# Patient Record
Sex: FEMALE | Race: OTHER | NOT HISPANIC OR LATINO
[De-identification: names, ages, dates, MRNs, and addresses within clinical notes are randomized per-mention and may not be internally consistent; named-entity substitution may affect disease eponyms.]

---

## 2017-09-13 ENCOUNTER — APPOINTMENT (OUTPATIENT)
Dept: SURGERY | Facility: CLINIC | Age: 52
End: 2017-09-13
Payer: COMMERCIAL

## 2017-09-13 VITALS
BODY MASS INDEX: 26.22 KG/M2 | HEART RATE: 94 BPM | DIASTOLIC BLOOD PRESSURE: 85 MMHG | HEIGHT: 68 IN | TEMPERATURE: 98.3 F | WEIGHT: 173 LBS | SYSTOLIC BLOOD PRESSURE: 138 MMHG

## 2017-09-13 DIAGNOSIS — Z78.9 OTHER SPECIFIED HEALTH STATUS: ICD-10-CM

## 2017-09-13 DIAGNOSIS — Z83.3 FAMILY HISTORY OF DIABETES MELLITUS: ICD-10-CM

## 2017-09-13 DIAGNOSIS — Z82.49 FAMILY HISTORY OF ISCHEMIC HEART DISEASE AND OTHER DISEASES OF THE CIRCULATORY SYSTEM: ICD-10-CM

## 2017-09-13 DIAGNOSIS — Z87.19 PERSONAL HISTORY OF OTHER DISEASES OF THE DIGESTIVE SYSTEM: ICD-10-CM

## 2017-09-13 PROBLEM — Z00.00 ENCOUNTER FOR PREVENTIVE HEALTH EXAMINATION: Status: ACTIVE | Noted: 2017-09-13

## 2017-09-13 PROCEDURE — 99202 OFFICE O/P NEW SF 15 MIN: CPT

## 2017-09-20 ENCOUNTER — APPOINTMENT (OUTPATIENT)
Dept: SURGERY | Facility: CLINIC | Age: 52
End: 2017-09-20
Payer: COMMERCIAL

## 2017-09-20 VITALS
HEIGHT: 68 IN | TEMPERATURE: 97.8 F | SYSTOLIC BLOOD PRESSURE: 124 MMHG | WEIGHT: 173 LBS | DIASTOLIC BLOOD PRESSURE: 79 MMHG | BODY MASS INDEX: 26.22 KG/M2 | HEART RATE: 64 BPM

## 2017-09-20 PROCEDURE — 11403 EXC TR-EXT B9+MARG 2.1-3CM: CPT

## 2017-10-02 ENCOUNTER — APPOINTMENT (OUTPATIENT)
Dept: SURGERY | Facility: CLINIC | Age: 52
End: 2017-10-02

## 2019-05-03 ENCOUNTER — EMERGENCY (EMERGENCY)
Facility: HOSPITAL | Age: 54
LOS: 1 days | Discharge: HOME | End: 2019-05-03
Attending: EMERGENCY MEDICINE
Payer: MEDICAID

## 2019-05-03 VITALS
HEIGHT: 67.5 IN | TEMPERATURE: 98 F | WEIGHT: 171.96 LBS | SYSTOLIC BLOOD PRESSURE: 117 MMHG | OXYGEN SATURATION: 100 % | RESPIRATION RATE: 18 BRPM | DIASTOLIC BLOOD PRESSURE: 82 MMHG | HEART RATE: 70 BPM

## 2019-05-03 PROCEDURE — 99283 EMERGENCY DEPT VISIT LOW MDM: CPT | Mod: 25

## 2019-05-03 PROCEDURE — 90715 TDAP VACCINE 7 YRS/> IM: CPT

## 2019-05-03 PROCEDURE — 90471 IMMUNIZATION ADMIN: CPT

## 2019-05-03 PROCEDURE — 99284 EMERGENCY DEPT VISIT MOD MDM: CPT | Mod: 25

## 2019-05-03 RX ORDER — ACETAMINOPHEN 500 MG
975 TABLET ORAL ONCE
Refills: 0 | Status: COMPLETED | OUTPATIENT
Start: 2019-05-03 | End: 2019-05-03

## 2019-05-03 RX ORDER — OXYCODONE HYDROCHLORIDE 5 MG/1
5 TABLET ORAL ONCE
Refills: 0 | Status: DISCONTINUED | OUTPATIENT
Start: 2019-05-03 | End: 2019-05-03

## 2019-05-03 RX ORDER — TETANUS TOXOID, REDUCED DIPHTHERIA TOXOID AND ACELLULAR PERTUSSIS VACCINE, ADSORBED 5; 2.5; 8; 8; 2.5 [IU]/.5ML; [IU]/.5ML; UG/.5ML; UG/.5ML; UG/.5ML
0.5 SUSPENSION INTRAMUSCULAR ONCE
Refills: 0 | Status: COMPLETED | OUTPATIENT
Start: 2019-05-03 | End: 2019-05-03

## 2019-05-03 RX ADMIN — OXYCODONE HYDROCHLORIDE 5 MILLIGRAM(S): 5 TABLET ORAL at 23:53

## 2019-05-03 RX ADMIN — Medication 975 MILLIGRAM(S): at 23:53

## 2019-05-03 RX ADMIN — TETANUS TOXOID, REDUCED DIPHTHERIA TOXOID AND ACELLULAR PERTUSSIS VACCINE, ADSORBED 0.5 MILLILITER(S): 5; 2.5; 8; 8; 2.5 SUSPENSION INTRAMUSCULAR at 23:53

## 2019-05-04 RX ORDER — OXYCODONE HYDROCHLORIDE 5 MG/1
1 TABLET ORAL
Qty: 5 | Refills: 0
Start: 2019-05-04
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
## 2019-05-04 NOTE — ED PROVIDER NOTE - DISPOSITION TYPE
DISCHARGE Purse String (Simple) Text: Given the location of the defect and the characteristics of the surrounding skin a pursestring closure was deemed most appropriate.  Undermining was performed circumfirentially around the surgical defect.  A purstring suture was then placed and tightened.

## 2019-07-06 ENCOUNTER — EMERGENCY (EMERGENCY)
Facility: HOSPITAL | Age: 54
LOS: 1 days | Discharge: HOME | End: 2019-07-06
Attending: EMERGENCY MEDICINE
Payer: MEDICAID

## 2019-07-06 VITALS
HEART RATE: 69 BPM | TEMPERATURE: 98 F | OXYGEN SATURATION: 100 % | DIASTOLIC BLOOD PRESSURE: 82 MMHG | SYSTOLIC BLOOD PRESSURE: 122 MMHG | HEIGHT: 67 IN | RESPIRATION RATE: 24 BRPM | WEIGHT: 169.09 LBS

## 2019-07-06 VITALS
RESPIRATION RATE: 20 BRPM | OXYGEN SATURATION: 100 % | DIASTOLIC BLOOD PRESSURE: 72 MMHG | SYSTOLIC BLOOD PRESSURE: 105 MMHG | HEART RATE: 58 BPM | TEMPERATURE: 98 F

## 2019-07-06 DIAGNOSIS — Z98.890 OTHER SPECIFIED POSTPROCEDURAL STATES: Chronic | ICD-10-CM

## 2019-07-06 LAB
ALBUMIN SERPL ELPH-MCNC: 4.2 G/DL — SIGNIFICANT CHANGE UP (ref 3.3–5)
ALP SERPL-CCNC: 77 U/L — SIGNIFICANT CHANGE UP (ref 40–120)
ALT FLD-CCNC: 16 U/L — SIGNIFICANT CHANGE UP (ref 10–45)
ANION GAP SERPL CALC-SCNC: 13 MMOL/L — SIGNIFICANT CHANGE UP (ref 5–17)
APPEARANCE UR: ABNORMAL
APTT BLD: 28.2 SEC — SIGNIFICANT CHANGE UP (ref 27.5–36.3)
AST SERPL-CCNC: 17 U/L — SIGNIFICANT CHANGE UP (ref 10–40)
BASOPHILS # BLD AUTO: 0.1 K/UL — SIGNIFICANT CHANGE UP (ref 0–0.2)
BASOPHILS NFR BLD AUTO: 0.8 % — SIGNIFICANT CHANGE UP (ref 0–2)
BILIRUB SERPL-MCNC: 0.4 MG/DL — SIGNIFICANT CHANGE UP (ref 0.2–1.2)
BILIRUB UR-MCNC: NEGATIVE — SIGNIFICANT CHANGE UP
BUN SERPL-MCNC: 17 MG/DL — SIGNIFICANT CHANGE UP (ref 7–23)
CALCIUM SERPL-MCNC: 9 MG/DL — SIGNIFICANT CHANGE UP (ref 8.4–10.5)
CHLORIDE SERPL-SCNC: 104 MMOL/L — SIGNIFICANT CHANGE UP (ref 96–108)
CO2 SERPL-SCNC: 25 MMOL/L — SIGNIFICANT CHANGE UP (ref 22–31)
COLOR SPEC: YELLOW — SIGNIFICANT CHANGE UP
CREAT SERPL-MCNC: 0.8 MG/DL — SIGNIFICANT CHANGE UP (ref 0.5–1.3)
DIFF PNL FLD: ABNORMAL
EOSINOPHIL # BLD AUTO: 0.1 K/UL — SIGNIFICANT CHANGE UP (ref 0–0.5)
EOSINOPHIL NFR BLD AUTO: 1.6 % — SIGNIFICANT CHANGE UP (ref 0–6)
GLUCOSE SERPL-MCNC: 136 MG/DL — HIGH (ref 70–99)
GLUCOSE UR QL: NEGATIVE — SIGNIFICANT CHANGE UP
HCG UR QL: NEGATIVE — SIGNIFICANT CHANGE UP
HCT VFR BLD CALC: 39.5 % — SIGNIFICANT CHANGE UP (ref 34.5–45)
HGB BLD-MCNC: 13.7 G/DL — SIGNIFICANT CHANGE UP (ref 11.5–15.5)
INR BLD: 0.92 RATIO — SIGNIFICANT CHANGE UP (ref 0.88–1.16)
KETONES UR-MCNC: NEGATIVE — SIGNIFICANT CHANGE UP
LEUKOCYTE ESTERASE UR-ACNC: NEGATIVE — SIGNIFICANT CHANGE UP
LYMPHOCYTES # BLD AUTO: 3.3 K/UL — SIGNIFICANT CHANGE UP (ref 1–3.3)
LYMPHOCYTES # BLD AUTO: 48.2 % — HIGH (ref 13–44)
MCHC RBC-ENTMCNC: 32.8 PG — SIGNIFICANT CHANGE UP (ref 27–34)
MCHC RBC-ENTMCNC: 34.6 GM/DL — SIGNIFICANT CHANGE UP (ref 32–36)
MCV RBC AUTO: 95 FL — SIGNIFICANT CHANGE UP (ref 80–100)
MONOCYTES # BLD AUTO: 0.4 K/UL — SIGNIFICANT CHANGE UP (ref 0–0.9)
MONOCYTES NFR BLD AUTO: 5.3 % — SIGNIFICANT CHANGE UP (ref 2–14)
NEUTROPHILS # BLD AUTO: 3 K/UL — SIGNIFICANT CHANGE UP (ref 1.8–7.4)
NEUTROPHILS NFR BLD AUTO: 44.1 % — SIGNIFICANT CHANGE UP (ref 43–77)
NITRITE UR-MCNC: NEGATIVE — SIGNIFICANT CHANGE UP
PH UR: 6 — SIGNIFICANT CHANGE UP (ref 5–8)
PLATELET # BLD AUTO: 200 K/UL — SIGNIFICANT CHANGE UP (ref 150–400)
POTASSIUM SERPL-MCNC: 3.8 MMOL/L — SIGNIFICANT CHANGE UP (ref 3.5–5.3)
POTASSIUM SERPL-SCNC: 3.8 MMOL/L — SIGNIFICANT CHANGE UP (ref 3.5–5.3)
PROT SERPL-MCNC: 7.1 G/DL — SIGNIFICANT CHANGE UP (ref 6–8.3)
PROT UR-MCNC: ABNORMAL
PROTHROM AB SERPL-ACNC: 10.5 SEC — SIGNIFICANT CHANGE UP (ref 10–12.9)
RBC # BLD: 4.16 M/UL — SIGNIFICANT CHANGE UP (ref 3.8–5.2)
RBC # FLD: 11.5 % — SIGNIFICANT CHANGE UP (ref 10.3–14.5)
SODIUM SERPL-SCNC: 142 MMOL/L — SIGNIFICANT CHANGE UP (ref 135–145)
SP GR SPEC: 1.03 — HIGH (ref 1.01–1.02)
UROBILINOGEN FLD QL: NEGATIVE — SIGNIFICANT CHANGE UP
WBC # BLD: 6.8 K/UL — SIGNIFICANT CHANGE UP (ref 3.8–10.5)
WBC # FLD AUTO: 6.8 K/UL — SIGNIFICANT CHANGE UP (ref 3.8–10.5)

## 2019-07-06 PROCEDURE — 96374 THER/PROPH/DIAG INJ IV PUSH: CPT

## 2019-07-06 PROCEDURE — 99284 EMERGENCY DEPT VISIT MOD MDM: CPT | Mod: 25

## 2019-07-06 PROCEDURE — 74176 CT ABD & PELVIS W/O CONTRAST: CPT | Mod: 26

## 2019-07-06 PROCEDURE — 80053 COMPREHEN METABOLIC PANEL: CPT

## 2019-07-06 PROCEDURE — 85610 PROTHROMBIN TIME: CPT

## 2019-07-06 PROCEDURE — 85730 THROMBOPLASTIN TIME PARTIAL: CPT

## 2019-07-06 PROCEDURE — 96361 HYDRATE IV INFUSION ADD-ON: CPT

## 2019-07-06 PROCEDURE — 74176 CT ABD & PELVIS W/O CONTRAST: CPT

## 2019-07-06 PROCEDURE — 99285 EMERGENCY DEPT VISIT HI MDM: CPT

## 2019-07-06 PROCEDURE — 87086 URINE CULTURE/COLONY COUNT: CPT

## 2019-07-06 PROCEDURE — 81025 URINE PREGNANCY TEST: CPT

## 2019-07-06 PROCEDURE — 81001 URINALYSIS AUTO W/SCOPE: CPT

## 2019-07-06 PROCEDURE — 85027 COMPLETE CBC AUTOMATED: CPT

## 2019-07-06 RX ORDER — SODIUM CHLORIDE 9 MG/ML
1000 INJECTION, SOLUTION INTRAVENOUS ONCE
Refills: 0 | Status: COMPLETED | OUTPATIENT
Start: 2019-07-06 | End: 2019-07-06

## 2019-07-06 RX ORDER — OXYCODONE HYDROCHLORIDE 5 MG/1
1 TABLET ORAL
Qty: 9 | Refills: 0
Start: 2019-07-06 | End: 2019-07-08

## 2019-07-06 RX ORDER — MORPHINE SULFATE 50 MG/1
4 CAPSULE, EXTENDED RELEASE ORAL ONCE
Refills: 0 | Status: DISCONTINUED | OUTPATIENT
Start: 2019-07-06 | End: 2019-07-06

## 2019-07-06 RX ADMIN — MORPHINE SULFATE 4 MILLIGRAM(S): 50 CAPSULE, EXTENDED RELEASE ORAL at 06:30

## 2019-07-06 RX ADMIN — SODIUM CHLORIDE 2000 MILLILITER(S): 9 INJECTION, SOLUTION INTRAVENOUS at 05:18

## 2019-07-06 RX ADMIN — SODIUM CHLORIDE 1000 MILLILITER(S): 9 INJECTION, SOLUTION INTRAVENOUS at 06:30

## 2019-07-06 RX ADMIN — MORPHINE SULFATE 4 MILLIGRAM(S): 50 CAPSULE, EXTENDED RELEASE ORAL at 05:18

## 2019-07-06 NOTE — ED PROVIDER NOTE - NS ED ROS FT
CONST: no fevers, chills, or lightheadedness  EYES: no pain, no vision change  HENT: atraumatic, no sore throat  CV: no chest pain, no palpitations  RESP: no shortness of breath  ABD: + abdominal pain, + nausea/vomiting  : no dysuria, no hematuria, + L sided flank pain  MSK: no musculoskeletal pain  NEURO: no headache, no focal weakness or loss of sensation  SKIN:  no rash, no lesions

## 2019-07-06 NOTE — ED PROVIDER NOTE - CARE PROVIDERS DIRECT ADDRESSES
[email or web address]

## 2019-07-06 NOTE — ED ADULT NURSE NOTE - NSIMPLEMENTINTERV_GEN_ALL_ED
Implemented All Universal Safety Interventions:  [place] to call system. Call bell, personal items and telephone within reach. Instruct patient to call for assistance. Room bathroom lighting operational. Non-slip footwear when patient is off stretcher. Physically safe environment: no spills, clutter or unnecessary equipment. Stretcher in lowest position, wheels locked, appropriate side rails in place.

## 2019-07-06 NOTE — ED PROVIDER NOTE - OBJECTIVE STATEMENT
54 F with PMH of renal stones requiring lithotripsy, IBS, MVP presenting to the ED with severe L sided flank pain that woke her up from sleep. Pain is a sharp pain radiating down.  Patient had some L sided abdominal discomfort yesterday, worsening this morning. She had 2 episodes of vomiting today. No dysuria or hematuria. Pain is similar to previous kidney stone episodes. No fevers, chills, chest pain, shortness of breath, diarrhea, or constipation.

## 2019-07-06 NOTE — ED PROVIDER NOTE - CLINICAL SUMMARY MEDICAL DECISION MAKING FREE TEXT BOX
Attending MD Herrera: 54F with h/o kidney stones in past presenting with left flank pain, likely recurrent ureteral colic. Plan for CT a/p to confirm, analgesia and reassess

## 2019-07-06 NOTE — ED PROVIDER NOTE - NSFOLLOWUPINSTRUCTIONS_ED_ALL_ED_FT
Please follow up with your primary physician in 24-48 hr.  Seek immediate medical care for any new/worsening signs or symptoms.  Follow up with urology regarding 5mm left kidney stone and 1.4cm exophytic lesion

## 2019-07-06 NOTE — ED PROVIDER NOTE - PHYSICAL EXAMINATION
*GEN:   comfortable, in no acute distress, AOx3  *EYES:   pupils equally round and reactive to light  *HEENT:   airway patent, moist mucosal membranes  *CV:   regular rate and rhythm  *RESP:   clear to auscultation bilaterally, non-labored  *ABD:   soft, nondistended, tender in the R flank area   *:   no cva tenderness   *EXTREM:   no MSK tenderness, no leg swelling  *SKIN:   dry, intact  *NEURO:   AOx3, no focal weakness or loss of sensation

## 2019-07-06 NOTE — ED PROVIDER NOTE - CARE PROVIDER_API CALL
Hoenig, David M (MD)  Urology  [facility] Dept of Urology, [address] [place] [address]  Phone: [phone]  Fax: [phone]  Follow Up Time:

## 2019-07-06 NOTE — ED ADULT NURSE REASSESSMENT NOTE - COMFORT CARE
plan of care explained/side rails up/warm blanket provided/treatment delay explained/wait time explained

## 2019-07-06 NOTE — ED PROVIDER NOTE - PROGRESS NOTE DETAILS
Flaco Winston MD, PGY3: Patient received at resident sign out. Discussed 5mm stone and exophytic lesion with the patient who verbalized understanding and requested urology contact information. Provided name and number for outpatient follow up. Pt appears well, no current pain, understands plan for follow up. Patient informed of ED visit findings, understands plan.  Patient provided with written and further verbal instructions not included in discharge paperwork.  Patient instructed to follow up with their primary care physician in 2-3 days and return for new, worsened, or persistent symptoms. Flaco Winston MD, PGY3: Patient received at resident sign out. Discussed 5mm stone and exophytic lesion with the patient who verbalized understanding and requested urology contact information. Provided name and number for outpatient follow up. If pt tolerates PO, will discharge. Pt appears well, no current pain, understands plan for follow up. Patient informed of ED visit findings, understands plan.  Patient provided with written and further verbal instructions not included in discharge paperwork.  Patient instructed to follow up with their primary care physician in 2-3 days and return for new, worsened, or persistent symptoms.

## 2019-07-06 NOTE — ED ADULT NURSE NOTE - OBJECTIVE STATEMENT
55 y/o female PMH kidney stones most recently last year requiring lithotripsy (was told she still had 3 more at the time that would possibly pass on their own), IBS presents to ED c/o L flank and abdominal pain that woke her up from sleep this morning. Pt has been having L flank pain for a few days, but attributed it to her herniate discs. This morning, pt woke up in severe L sided pain, +nausea and vomiting. Pain is intermittent, 9/10 at its worst. Describes it as sharp and stabbing. Denies fever, chills, chest pain. Lungs clear b/l. Skin warm, dry, intact. Gross motor and neuro intact. Abdomen is soft, nondistended, nontender to palpation. Tenderness noted to L flank. 20G IV placed in LAC. Safety and comfort provided. Family at bedside.

## 2019-07-06 NOTE — ED PROVIDER NOTE - NSFOLLOWUPCLINICS_GEN_ALL_ED_FT
[facility]  Urology  [address] [place] [address] - [address] [place] [address]  Phone: [phone]  Fax:   Follow Up Time:

## 2019-07-07 LAB
CULTURE RESULTS: SIGNIFICANT CHANGE UP
SPECIMEN SOURCE: SIGNIFICANT CHANGE UP

## 2019-07-19 ENCOUNTER — APPOINTMENT (OUTPATIENT)
Dept: UROLOGY | Facility: CLINIC | Age: 54
End: 2019-07-19
Payer: MEDICAID

## 2019-07-19 VITALS
WEIGHT: 170 LBS | BODY MASS INDEX: 26.37 KG/M2 | HEART RATE: 68 BPM | TEMPERATURE: 98.7 F | DIASTOLIC BLOOD PRESSURE: 68 MMHG | SYSTOLIC BLOOD PRESSURE: 104 MMHG | HEIGHT: 67.5 IN | RESPIRATION RATE: 16 BRPM

## 2019-07-19 PROCEDURE — 99204 OFFICE O/P NEW MOD 45 MIN: CPT

## 2019-07-19 NOTE — HISTORY OF PRESENT ILLNESS
[FreeTextEntry1] : Pt is 55 yo female with recent left renal colic.  Passed several stones in 2018, and had ESWL 2018 (done with Dr. Merrick Roger "terrible experience", metrolitho, ended with [facility] eval/management).  Recent left renal colic, and eval in ER (had to access via pacs separate from this [employer] account).  No fever.  No gross hematuria.  Pain mild currently left side/abdomen.  Issue also of 'hyperdense/isodense cyst".\par Has not passed stone.\par \par PMH: aguirre's esoph, mitral valve prolaps, sleep apnea, anxiety\par PSH: ESWL, \par Meds: ibuprofen, oxycodone\par NKDA\par FH: mult members with kidney stones.\par SH: never a smoker, previous 3 cups per day, now 1.\par  [None] : no symptoms

## 2019-07-19 NOTE — ASSESSMENT
[FreeTextEntry1] : CT scan reviewed: clearly cyst right kidney, left renal structure lower pole: isodense/hyperdense cyst vs. small renal mass (1.5 cm diameter), but also small 3 mm stone right kidney, 4 to 5 mm left prox ureter stone, and cluster of 4 to 5 mm left lower pole stone fragments. Mild-to-mod left hydro.\par \par I had long discussion with patient about their stones, and about options, risks, and benefits of all treatments.  Main options for definitive stone treatment include ESWL, URS, PCNL.  \par \par ESWL success best with smaller, less dense stones, and with short skin to stone distance and favorable location of the stone within the urinary tract, while URS is more successful treatment with multiple stones, more dense stones, or challenging body habitus or stone location.  PCNL is best option for larger, more dense and complex stones, and particularly those involving the lower pole.  Non-definitive stone treatment options for drainage, using either stents or nephrostomy, also reviewed: these are of lower immediate surgical risks, but incur multiple procedures to manage and may have their own complications and effects on quality of life.  Still, nephrostomy or nephroureteral catheter can allow maintenance of urinary system drainage without surgical risks, and management in office with exchanges (avoiding the anesthesia and testing which would be present with bilateral internal stent exchange).\par \par Risks of nontreatment with obstruction can lead to very high rate of renal function loss in stone-bearing kidney over the next months to years.\par \par In this patient's case, I recommend... observe for passage vs left URS with laser, and delayed eval of renal lesion\par \par Risks/benefits/success/recovery expectations all reviewed at length, particularly with respect to patient's comorbidities, and inclusive of infection/sepsis, bleeding, need for secondary procedures or secondary stages such as embolization or open surgery, and even risks of death due to acute issues superimposed on comorbidities.\par \par Pt prefers to undergo: left URS with laser/stone removal, left stent\par \par Will schedule.\par \par Urine culture, PST appt to schedule once surgery scheduled.\par

## 2019-07-19 NOTE — LETTER BODY
[Dear  ___] : Dear  [unfilled], [Consult Letter:] : I had the pleasure of evaluating your patient, [unfilled]. [( Thank you for referring [unfilled] for consultation for _____ )] : Thank you for referring [unfilled] for consultation for [unfilled] [Please see my note below.] : Please see my note below. [Consult Closing:] : Thank you very much for allowing me to participate in the care of this patient.  If you have any questions, please do not hesitate to contact me. [Sincerely,] : Sincerely, [FreeTextEntry1] : Pt is 55 yo female with recent left renal colic.  Passed several stones in 2018, and had ESWL 2018 (done with Dr. Merrick Roger "terrible experience", metrolitho, ended with [facility] eval/management).  Recent left renal colic, and eval in ER (had to access via pacs separate from this [employer] account).  No fever.  No gross hematuria.  Pain mild currently left side/abdomen.  Issue also of 'hyperdense/isodense cyst".\par Has not passed stone.\par \par PMH: aguirre's esoph, mitral valve prolaps, sleep apnea, anxiety\par PSH: ESWL, \par Meds: ibuprofen, oxycodone\par NKDA\par FH: mult members with kidney stones.\par SH: never a smoker, previous 3 cups per day, now 1.\par \par CT scan reviewed: clearly cyst right kidney, left renal structure lower pole: isodense/hyperdense cyst vs. small renal mass (1.5 cm diameter), but also small 3 mm stone right kidney, 4 to 5 mm left prox ureter stone, and cluster of 4 to 5 mm left lower pole stone fragments. Mild-to-mod left hydro.\par \par I had long discussion with patient about their stones, and about options, risks, and benefits of all treatments.  Main options for definitive stone treatment include ESWL, URS, PCNL.  \par \par ESWL success best with smaller, less dense stones, and with short skin to stone distance and favorable location of the stone within the urinary tract, while URS is more successful treatment with multiple stones, more dense stones, or challenging body habitus or stone location.  PCNL is best option for larger, more dense and complex stones, and particularly those involving the lower pole.  Non-definitive stone treatment options for drainage, using either stents or nephrostomy, also reviewed: these are of lower immediate surgical risks, but incur multiple procedures to manage and may have their own complications and effects on quality of life.  Still, nephrostomy or nephroureteral catheter can allow maintenance of urinary system drainage without surgical risks, and management in office with exchanges (avoiding the anesthesia and testing which would be present with bilateral internal stent exchange).\par \par Risks of nontreatment with obstruction can lead to very high rate of renal function loss in stone-bearing kidney over the next months to years.\par \par In this patient's case, I recommend... observe for passage vs left URS with laser, and delayed eval of renal lesion\par \par Risks/benefits/success/recovery expectations all reviewed at length, particularly with respect to patient's comorbidities, and inclusive of infection/sepsis, bleeding, need for secondary procedures or secondary stages such as embolization or open surgery, and even risks of death due to acute issues superimposed on comorbidities.\par \par Pt prefers to undergo: left URS with laser/stone removal, left stent\par \par Will schedule.\par \par Urine culture, PST appt to schedule once surgery scheduled.\par

## 2019-07-19 NOTE — REVIEW OF SYSTEMS
[Negative] : Heme/Lymph [Recent Weight Gain (___ Lbs)] : recent [unfilled] ~Ulb weight gain [Recent Weight Loss (___ Lbs)] : recent [unfilled] ~Ulb weight loss [Eyesight Problems] : eyesight problems [Palpitations] : palpitations [Abdominal Pain] : abdominal pain [Vomiting] : vomiting [Heartburn] : heartburn [Date of last menstrual period ____] : date of last menstrual period: [unfilled] [Seen by urologist before (Name)  ___] : Previously seen by a urologist: [unfilled] [Urine Infection (bladder/kidney)] : bladder/kidney infection [Told you have blood in urine on a urine test] : told blood was present in a urine test [History of kidney stones] : history of kidney stones [Joint Pain] : joint pain

## 2019-08-05 ENCOUNTER — OUTPATIENT (OUTPATIENT)
Dept: OUTPATIENT SERVICES | Facility: HOSPITAL | Age: 54
LOS: 1 days | End: 2019-08-05
Payer: MEDICAID

## 2019-08-05 VITALS
TEMPERATURE: 98 F | SYSTOLIC BLOOD PRESSURE: 126 MMHG | DIASTOLIC BLOOD PRESSURE: 87 MMHG | WEIGHT: 162.92 LBS | OXYGEN SATURATION: 98 % | HEIGHT: 67 IN | RESPIRATION RATE: 16 BRPM | HEART RATE: 68 BPM

## 2019-08-05 DIAGNOSIS — N20.0 CALCULUS OF KIDNEY: ICD-10-CM

## 2019-08-05 DIAGNOSIS — Z01.818 ENCOUNTER FOR OTHER PREPROCEDURAL EXAMINATION: ICD-10-CM

## 2019-08-05 DIAGNOSIS — Z98.890 OTHER SPECIFIED POSTPROCEDURAL STATES: Chronic | ICD-10-CM

## 2019-08-05 LAB
ANION GAP SERPL CALC-SCNC: 12 MMOL/L — SIGNIFICANT CHANGE UP (ref 5–17)
BUN SERPL-MCNC: 12 MG/DL — SIGNIFICANT CHANGE UP (ref 7–23)
CALCIUM SERPL-MCNC: 10.2 MG/DL — SIGNIFICANT CHANGE UP (ref 8.4–10.5)
CHLORIDE SERPL-SCNC: 102 MMOL/L — SIGNIFICANT CHANGE UP (ref 96–108)
CO2 SERPL-SCNC: 26 MMOL/L — SIGNIFICANT CHANGE UP (ref 22–31)
CREAT SERPL-MCNC: 0.71 MG/DL — SIGNIFICANT CHANGE UP (ref 0.5–1.3)
GLUCOSE SERPL-MCNC: 93 MG/DL — SIGNIFICANT CHANGE UP (ref 70–99)
POTASSIUM SERPL-MCNC: 4.2 MMOL/L — SIGNIFICANT CHANGE UP (ref 3.5–5.3)
POTASSIUM SERPL-SCNC: 4.2 MMOL/L — SIGNIFICANT CHANGE UP (ref 3.5–5.3)
SODIUM SERPL-SCNC: 140 MMOL/L — SIGNIFICANT CHANGE UP (ref 135–145)

## 2019-08-05 PROCEDURE — G0463: CPT

## 2019-08-05 PROCEDURE — 80048 BASIC METABOLIC PNL TOTAL CA: CPT

## 2019-08-05 PROCEDURE — 87086 URINE CULTURE/COLONY COUNT: CPT

## 2019-08-05 PROCEDURE — 85027 COMPLETE CBC AUTOMATED: CPT

## 2019-08-05 NOTE — H&P PST ADULT - NSICDXPASTMEDICALHX_GEN_ALL_CORE_FT
PAST MEDICAL HISTORY:  MILADY (Landin's esophagus)     GERD (gastroesophageal reflux disease)     MVP (mitral valve prolapse)     NORMAN (obstructive sleep apnea) Mild    Renal stone

## 2019-08-05 NOTE — H&P PST ADULT - HISTORY OF PRESENT ILLNESS
55 y/o female H/O Anxiety, GERD, IBS, kidney stone, NORMAN (mild), MVP, C/O left flank pain with dark urine x 5 weeks, imaging studies done and found to have kidney stone. Today she presents to [facility] for scheduled Cystoscopy, Left Ureteroscopy with Laser, Left stent on 8/9/19. Denies any palpitations, SOB, N/V, fever or chills.

## 2019-08-05 NOTE — H&P PST ADULT - NSICDXPROBLEM_GEN_ALL_CORE_FT
PROBLEM DIAGNOSES  Problem: Renal stone  Assessment and Plan: Cystoscopy, Left Ureteroscopy with Laser, Left stent on 8/9/19  Pre-op education provided - all questions answered

## 2019-08-05 NOTE — H&P PST ADULT - LYMPHATIC
supraclavicular L/anterior cervical R/posterior cervical L/anterior cervical L/supraclavicular R/posterior cervical R

## 2019-08-05 NOTE — H&P PST ADULT - NEUROLOGICAL DETAILS
responds to verbal commands/sensation intact/no spontaneous movement/alert and oriented x 3/normal strength

## 2019-08-06 LAB
CULTURE RESULTS: SIGNIFICANT CHANGE UP
HCT VFR BLD CALC: 39.4 % — SIGNIFICANT CHANGE UP (ref 34.5–45)
HGB BLD-MCNC: 13 G/DL — SIGNIFICANT CHANGE UP (ref 11.5–15.5)
MCHC RBC-ENTMCNC: 31.3 PG — SIGNIFICANT CHANGE UP (ref 27–34)
MCHC RBC-ENTMCNC: 33 GM/DL — SIGNIFICANT CHANGE UP (ref 32–36)
MCV RBC AUTO: 94.9 FL — SIGNIFICANT CHANGE UP (ref 80–100)
PLATELET # BLD AUTO: 226 K/UL — SIGNIFICANT CHANGE UP (ref 150–400)
RBC # BLD: 4.15 M/UL — SIGNIFICANT CHANGE UP (ref 3.8–5.2)
RBC # FLD: 12.1 % — SIGNIFICANT CHANGE UP (ref 10.3–14.5)
SPECIMEN SOURCE: SIGNIFICANT CHANGE UP
WBC # BLD: 7.48 K/UL — SIGNIFICANT CHANGE UP (ref 3.8–10.5)
WBC # FLD AUTO: 7.48 K/UL — SIGNIFICANT CHANGE UP (ref 3.8–10.5)

## 2019-08-08 ENCOUNTER — INPATIENT (INPATIENT)
Facility: HOSPITAL | Age: 54
LOS: 1 days | Discharge: HOME | DRG: 661 | End: 2019-08-10
Attending: UROLOGY | Admitting: UROLOGY
Payer: MEDICAID

## 2019-08-08 ENCOUNTER — TRANSCRIPTION ENCOUNTER (OUTPATIENT)
Age: 54
End: 2019-08-08

## 2019-08-08 VITALS
HEART RATE: 83 BPM | WEIGHT: 173.06 LBS | HEIGHT: 67.5 IN | RESPIRATION RATE: 16 BRPM | OXYGEN SATURATION: 99 % | TEMPERATURE: 98 F | DIASTOLIC BLOOD PRESSURE: 77 MMHG | SYSTOLIC BLOOD PRESSURE: 114 MMHG

## 2019-08-08 DIAGNOSIS — Z98.890 OTHER SPECIFIED POSTPROCEDURAL STATES: Chronic | ICD-10-CM

## 2019-08-08 DIAGNOSIS — R10.13 EPIGASTRIC PAIN: ICD-10-CM

## 2019-08-08 LAB
ALBUMIN SERPL ELPH-MCNC: 4.2 G/DL — SIGNIFICANT CHANGE UP (ref 3.3–5)
ALP SERPL-CCNC: 76 U/L — SIGNIFICANT CHANGE UP (ref 40–120)
ALT FLD-CCNC: 25 U/L — SIGNIFICANT CHANGE UP (ref 10–45)
ANION GAP SERPL CALC-SCNC: 14 MMOL/L — SIGNIFICANT CHANGE UP (ref 5–17)
APPEARANCE UR: CLEAR — SIGNIFICANT CHANGE UP
APTT BLD: 26.9 SEC — LOW (ref 27.5–36.3)
AST SERPL-CCNC: 18 U/L — SIGNIFICANT CHANGE UP (ref 10–40)
BACTERIA # UR AUTO: NEGATIVE — SIGNIFICANT CHANGE UP
BASOPHILS # BLD AUTO: 0 K/UL — SIGNIFICANT CHANGE UP (ref 0–0.2)
BASOPHILS NFR BLD AUTO: 0.3 % — SIGNIFICANT CHANGE UP (ref 0–2)
BILIRUB SERPL-MCNC: 0.6 MG/DL — SIGNIFICANT CHANGE UP (ref 0.2–1.2)
BILIRUB UR-MCNC: NEGATIVE — SIGNIFICANT CHANGE UP
BUN SERPL-MCNC: 12 MG/DL — SIGNIFICANT CHANGE UP (ref 7–23)
CALCIUM SERPL-MCNC: 9.5 MG/DL — SIGNIFICANT CHANGE UP (ref 8.4–10.5)
CHLORIDE SERPL-SCNC: 102 MMOL/L — SIGNIFICANT CHANGE UP (ref 96–108)
CO2 SERPL-SCNC: 22 MMOL/L — SIGNIFICANT CHANGE UP (ref 22–31)
COLOR SPEC: SIGNIFICANT CHANGE UP
CREAT SERPL-MCNC: 0.6 MG/DL — SIGNIFICANT CHANGE UP (ref 0.5–1.3)
DIFF PNL FLD: ABNORMAL
EOSINOPHIL # BLD AUTO: 0 K/UL — SIGNIFICANT CHANGE UP (ref 0–0.5)
EOSINOPHIL NFR BLD AUTO: 0.2 % — SIGNIFICANT CHANGE UP (ref 0–6)
EPI CELLS # UR: 1 /HPF — SIGNIFICANT CHANGE UP
GAS PNL BLDV: SIGNIFICANT CHANGE UP
GLUCOSE SERPL-MCNC: 114 MG/DL — HIGH (ref 70–99)
GLUCOSE UR QL: NEGATIVE — SIGNIFICANT CHANGE UP
HCG UR QL: NEGATIVE — SIGNIFICANT CHANGE UP
HCT VFR BLD CALC: 40.6 % — SIGNIFICANT CHANGE UP (ref 34.5–45)
HGB BLD-MCNC: 13.7 G/DL — SIGNIFICANT CHANGE UP (ref 11.5–15.5)
HYALINE CASTS # UR AUTO: 3 /LPF — HIGH (ref 0–2)
INR BLD: 1.07 RATIO — SIGNIFICANT CHANGE UP (ref 0.88–1.16)
KETONES UR-MCNC: NEGATIVE — SIGNIFICANT CHANGE UP
LEUKOCYTE ESTERASE UR-ACNC: NEGATIVE — SIGNIFICANT CHANGE UP
LIDOCAIN IGE QN: 17 U/L — SIGNIFICANT CHANGE UP (ref 7–60)
LYMPHOCYTES # BLD AUTO: 1 K/UL — SIGNIFICANT CHANGE UP (ref 1–3.3)
LYMPHOCYTES # BLD AUTO: 10.3 % — LOW (ref 13–44)
MCHC RBC-ENTMCNC: 31.8 PG — SIGNIFICANT CHANGE UP (ref 27–34)
MCHC RBC-ENTMCNC: 33.7 GM/DL — SIGNIFICANT CHANGE UP (ref 32–36)
MCV RBC AUTO: 94.4 FL — SIGNIFICANT CHANGE UP (ref 80–100)
MONOCYTES # BLD AUTO: 0.3 K/UL — SIGNIFICANT CHANGE UP (ref 0–0.9)
MONOCYTES NFR BLD AUTO: 2.8 % — SIGNIFICANT CHANGE UP (ref 2–14)
NEUTROPHILS # BLD AUTO: 8.2 K/UL — HIGH (ref 1.8–7.4)
NEUTROPHILS NFR BLD AUTO: 86.3 % — HIGH (ref 43–77)
NITRITE UR-MCNC: NEGATIVE — SIGNIFICANT CHANGE UP
PH UR: 7.5 — SIGNIFICANT CHANGE UP (ref 5–8)
PLATELET # BLD AUTO: 192 K/UL — SIGNIFICANT CHANGE UP (ref 150–400)
POTASSIUM SERPL-MCNC: 4.5 MMOL/L — SIGNIFICANT CHANGE UP (ref 3.5–5.3)
POTASSIUM SERPL-SCNC: 4.5 MMOL/L — SIGNIFICANT CHANGE UP (ref 3.5–5.3)
PROT SERPL-MCNC: 7.7 G/DL — SIGNIFICANT CHANGE UP (ref 6–8.3)
PROT UR-MCNC: ABNORMAL
PROTHROM AB SERPL-ACNC: 12.3 SEC — SIGNIFICANT CHANGE UP (ref 10–12.9)
RBC # BLD: 4.3 M/UL — SIGNIFICANT CHANGE UP (ref 3.8–5.2)
RBC # FLD: 11.3 % — SIGNIFICANT CHANGE UP (ref 10.3–14.5)
RBC CASTS # UR COMP ASSIST: 20 /HPF — HIGH (ref 0–4)
SODIUM SERPL-SCNC: 138 MMOL/L — SIGNIFICANT CHANGE UP (ref 135–145)
SP GR SPEC: 1.02 — SIGNIFICANT CHANGE UP (ref 1.01–1.02)
TROPONIN T, HIGH SENSITIVITY RESULT: <6 NG/L — SIGNIFICANT CHANGE UP (ref 0–51)
UROBILINOGEN FLD QL: NEGATIVE — SIGNIFICANT CHANGE UP
WBC # BLD: 9.5 K/UL — SIGNIFICANT CHANGE UP (ref 3.8–10.5)
WBC # FLD AUTO: 9.5 K/UL — SIGNIFICANT CHANGE UP (ref 3.8–10.5)
WBC UR QL: 5 /HPF — SIGNIFICANT CHANGE UP (ref 0–5)

## 2019-08-08 PROCEDURE — 99232 SBSQ HOSP IP/OBS MODERATE 35: CPT

## 2019-08-08 PROCEDURE — 76705 ECHO EXAM OF ABDOMEN: CPT | Mod: 26

## 2019-08-08 PROCEDURE — 93010 ELECTROCARDIOGRAM REPORT: CPT

## 2019-08-08 PROCEDURE — 71046 X-RAY EXAM CHEST 2 VIEWS: CPT | Mod: 26

## 2019-08-08 PROCEDURE — 99285 EMERGENCY DEPT VISIT HI MDM: CPT

## 2019-08-08 RX ORDER — CEFAZOLIN SODIUM 1 G
1000 VIAL (EA) INJECTION ONCE
Refills: 0 | Status: DISCONTINUED | OUTPATIENT
Start: 2019-08-08 | End: 2019-08-08

## 2019-08-08 RX ORDER — DOCUSATE SODIUM 100 MG
100 CAPSULE ORAL THREE TIMES A DAY
Refills: 0 | Status: DISCONTINUED | OUTPATIENT
Start: 2019-08-08 | End: 2019-08-09

## 2019-08-08 RX ORDER — CEFAZOLIN SODIUM 1 G
1000 VIAL (EA) INJECTION EVERY 8 HOURS
Refills: 0 | Status: DISCONTINUED | OUTPATIENT
Start: 2019-08-08 | End: 2019-08-09

## 2019-08-08 RX ORDER — SODIUM CHLORIDE 9 MG/ML
1000 INJECTION INTRAMUSCULAR; INTRAVENOUS; SUBCUTANEOUS
Refills: 0 | Status: DISCONTINUED | OUTPATIENT
Start: 2019-08-08 | End: 2019-08-09

## 2019-08-08 RX ORDER — KETOROLAC TROMETHAMINE 30 MG/ML
15 SYRINGE (ML) INJECTION ONCE
Refills: 0 | Status: DISCONTINUED | OUTPATIENT
Start: 2019-08-08 | End: 2019-08-08

## 2019-08-08 RX ORDER — SENNA PLUS 8.6 MG/1
2 TABLET ORAL AT BEDTIME
Refills: 0 | Status: DISCONTINUED | OUTPATIENT
Start: 2019-08-08 | End: 2019-08-09

## 2019-08-08 RX ORDER — ONDANSETRON 8 MG/1
4 TABLET, FILM COATED ORAL ONCE
Refills: 0 | Status: COMPLETED | OUTPATIENT
Start: 2019-08-08 | End: 2019-08-08

## 2019-08-08 RX ORDER — SODIUM CHLORIDE 9 MG/ML
1000 INJECTION INTRAMUSCULAR; INTRAVENOUS; SUBCUTANEOUS ONCE
Refills: 0 | Status: COMPLETED | OUTPATIENT
Start: 2019-08-08 | End: 2019-08-08

## 2019-08-08 RX ORDER — OXYCODONE AND ACETAMINOPHEN 5; 325 MG/1; MG/1
1 TABLET ORAL EVERY 4 HOURS
Refills: 0 | Status: DISCONTINUED | OUTPATIENT
Start: 2019-08-08 | End: 2019-08-09

## 2019-08-08 RX ORDER — FAMOTIDINE 10 MG/ML
20 INJECTION INTRAVENOUS ONCE
Refills: 0 | Status: COMPLETED | OUTPATIENT
Start: 2019-08-08 | End: 2019-08-08

## 2019-08-08 RX ORDER — HEPARIN SODIUM 5000 [USP'U]/ML
5000 INJECTION INTRAVENOUS; SUBCUTANEOUS EVERY 12 HOURS
Refills: 0 | Status: DISCONTINUED | OUTPATIENT
Start: 2019-08-08 | End: 2019-08-09

## 2019-08-08 RX ORDER — TAMSULOSIN HYDROCHLORIDE 0.4 MG/1
0.4 CAPSULE ORAL AT BEDTIME
Refills: 0 | Status: DISCONTINUED | OUTPATIENT
Start: 2019-08-08 | End: 2019-08-09

## 2019-08-08 RX ORDER — ACETAMINOPHEN 500 MG
975 TABLET ORAL ONCE
Refills: 0 | Status: COMPLETED | OUTPATIENT
Start: 2019-08-08 | End: 2019-08-08

## 2019-08-08 RX ORDER — ONDANSETRON 8 MG/1
4 TABLET, FILM COATED ORAL EVERY 6 HOURS
Refills: 0 | Status: DISCONTINUED | OUTPATIENT
Start: 2019-08-08 | End: 2019-08-09

## 2019-08-08 RX ORDER — CEFAZOLIN SODIUM 1 G
1000 VIAL (EA) INJECTION ONCE
Refills: 0 | Status: COMPLETED | OUTPATIENT
Start: 2019-08-08 | End: 2019-08-08

## 2019-08-08 RX ADMIN — Medication 100 MILLIGRAM(S): at 14:40

## 2019-08-08 RX ADMIN — Medication 15 MILLIGRAM(S): at 13:54

## 2019-08-08 RX ADMIN — Medication 100 MILLIGRAM(S): at 23:17

## 2019-08-08 RX ADMIN — SODIUM CHLORIDE 2000 MILLILITER(S): 9 INJECTION INTRAMUSCULAR; INTRAVENOUS; SUBCUTANEOUS at 11:25

## 2019-08-08 RX ADMIN — Medication 30 MILLILITER(S): at 12:13

## 2019-08-08 RX ADMIN — Medication 100 MILLIGRAM(S): at 22:19

## 2019-08-08 RX ADMIN — TAMSULOSIN HYDROCHLORIDE 0.4 MILLIGRAM(S): 0.4 CAPSULE ORAL at 22:19

## 2019-08-08 RX ADMIN — FAMOTIDINE 20 MILLIGRAM(S): 10 INJECTION INTRAVENOUS at 11:25

## 2019-08-08 RX ADMIN — ONDANSETRON 4 MILLIGRAM(S): 8 TABLET, FILM COATED ORAL at 11:25

## 2019-08-08 RX ADMIN — Medication 975 MILLIGRAM(S): at 12:13

## 2019-08-08 RX ADMIN — HEPARIN SODIUM 5000 UNIT(S): 5000 INJECTION INTRAVENOUS; SUBCUTANEOUS at 19:18

## 2019-08-08 RX ADMIN — SODIUM CHLORIDE 125 MILLILITER(S): 9 INJECTION INTRAMUSCULAR; INTRAVENOUS; SUBCUTANEOUS at 23:18

## 2019-08-08 NOTE — ED PROVIDER NOTE - CLINICAL SUMMARY MEDICAL DECISION MAKING FREE TEXT BOX
55 yo female PMHx MVP, GERD, Landin's esophagus, kidney stones s/p lithotripsy in past presents to the ED c/o epigastric pain w/ associated nausea that began last night. Pt is supposed to have urethral stent placed tomorrow but woke up with epigastric pain and nausea. EKG is unremarkable. Will obtain  consult, blood work, lipase, RUQ sono, and re-eval. -ZR

## 2019-08-08 NOTE — ED ADULT NURSE NOTE - PMH
MILADY (Landin's esophagus)    GERD (gastroesophageal reflux disease)    MVP (mitral valve prolapse)    NORMAN (obstructive sleep apnea)  Mild  Renal stone

## 2019-08-08 NOTE — ED ADULT NURSE NOTE - OBJECTIVE STATEMENT
54 year old female PMH of kidney stones with lithotripsy and Barretts esophagus presents to ED with LLQ and epigastric pain.  She was recently diagnosed with kidney stones and supposed to have a stent placed and stones removed tomorrow.  She has had worsening LLQ pain and a bloating sensation since yesterday and woke up this morning feeling weak with a bad headache and epigastric pain with associated nausea but no vomiting.  She said pain in LLQ is worse with pressure and epigastric pain is worse when getting up.  She took Omeprazole this morning with no relief of symptoms.  She denies: chest pain, shortness of breath, vomiting, fevers, dysuria, hematuria.

## 2019-08-08 NOTE — ED PROVIDER NOTE - ABDOMINAL TENDER
left lower quadrant/epigastric/right upper quadrant epigastric/left lower quadrant/right upper quadrant/abd is soft, mild ttp epigastric, RUQ, and LLQ. No bloating. No rebound, guarding or rigidity noted. No CVA ttp. Negative Mcmullen's negative Rovsings. No McBurney's point tenderness.

## 2019-08-08 NOTE — CHART NOTE - NSCHARTNOTEFT_GEN_A_CORE
This is a 54 year old female with a known left ureteral calculus scheduled for left ureteroscopy 8/9, Landin’s esophagus, NORMAN, GERD, IBS, who presents with worsening epigastric pain associated with nausea since last night.  Describes the pain as States the pain from the stone has remained the same.  Denies fevers, chills, CP, SOB, vomiting.  Plan is to admit for pain control and prepare for her scheduled ureteroscopy tomorrow.  Patient already has a presurgical testing H+P, which has been amended. This is a 54 year old female with a known left ureteral calculus scheduled for left ureteroscopy 8/9, Landin’s esophagus, NORMAN, GERD, IBS, who presents with worsening epigastric pain associated with nausea since last night.  Describes the pain as localized to the epigastric area.  States the pain from the stone has remained the same.  Denies fevers, chills, CP, SOB, vomiting.  Plan is to admit for pain control and prepare for her scheduled ureteroscopy tomorrow.  Patient already has a presurgical testing H+P, which has been amended. This is a 54 year old female with a known left ureteral calculus scheduled for left ureteroscopy 8/9, Landin’s esophagus, NORMAN, GERD, IBS, who presents with worsening epigastric pain associated with nausea since last night.  Describes the pain as localized to the epigastric area.  States the pain from the stone has remained the same.  Denies fevers, chills, CP, SOB, vomiting.  Workup by ER resulted in reflux as being the cause.  The pain improved after being given a GI cocktail.  Plan is to admit for pain control and prepare for her scheduled ureteroscopy tomorrow.  Patient already has a presurgical testing H+P, which has been amended.

## 2019-08-08 NOTE — ED ADULT TRIAGE NOTE - BSA (M2)
Initial SW/CM Assessment/Plan of Care Note     Baseline Assessment : Rosa Mathis MSW, LCSW [phone]    Received SW consult for d/c planning for this 52 year old female who was admitted 4/11/2017 as Inpatient with a diagnosis of asthma exacerbation requiring bi-pap. Met with pt this date and she was A&O and no longer using bipap. Pt was receptive to SW visit.     Pt states she is now living with [place] friends in a house where she is independent with cares but has their support as needed. She is not involved with any community agencies and uses [employer]  ([phone]) for transportation.     AODA: Pt acknowledges having a relapse this past weekend and using cocaine but states she attends a support group 3 times/week and it has been beneficial. She denies needing any resources but will provide in AVS anyway.      Patient does  report having a Power of  for Healthcare identifying Jose Witt as her agent. We do not have a copy of docuement in our files.   Patient’s Primary Care Provider is CHRISTOPHER Trejo.     Pt's only concern for discharge is obtianing a nebulizer. Pt did not  nebulizer she was prescribed through [facility] ([phone]) on 1/12/2017. Anticipate pt will need a new prescription for a nebulizer if recommended.    SW will follow.    Rosa Mathis MSW, LCSW [phone]    Medical History  Past Medical History:   Diagnosis Date   • COPD (chronic obstructive pulmonary disease)    • GERD (gastroesophageal reflux disease) 3/5/2015   • Heart murmur     patient reported.7/9/15.  Echo: EF 65%.No valvular abn.   • History of pneumonia     Asthma exacerbations with Multiple hospitalizations in 2015, including Hx ICU,BIPAP,Intubation. Last hosp. 8/9/15 w/ PNA @[place].   • Hx Asthma with COPD with exacerbation     Asthma exacerbations with Multiple hospitalizations in 2015, including Hx ICU,BIPAP,Intubation. Last hosp. 8/9/15 w/ PNA @[place].   • Hx Cleft palate Repair     patient reported   • Hx  Sinusitis    • Hx Substance abuse     recovering from crack/ cocaine abuse.  She hasn't had any use until last night and took \"one hit\".  She has used crack since she was 25 years old, mostly on weekends.No IVDA.   • Hypertension     7/9/15.  Echo: EF 65%.No valvular abn.   • RAD (reactive airway disease) -Asthma/COPD[J45.909]     Multiple hospitalizations in 2015, including Hx ICU,BIPAP,Intubation. Last hosp. 8/9/15 w/ PNA @[place].   • Staphylococcus aureus infection        Prior to Admission Status  Functional Status  Functional Status Comments: independent    Agency/Support  Type of Services Prior to Hospitalization: None  Support Systems: Parent, Friends/neighbors, Children  Home Devices/Equipment: None  Mobility Assist Devices: None  Sensory Support Devices: None    Current Status  PT Ambulation Tips:    PT Transfer Tips:    OT Bathing Tips:    OT Dressing Tips:    OT Toileting Tips:    OT Feeding Tips:    SLP Swallow/Feeding Tips:    SLP Comm/Cog Tips:    Current Mental Status: Guarded, Pleasant  Stressors: AODA Abuse    Insurance  Primary: [employer] MEDICAID  Secondary: N/A    Barriers to Discharge  Identified Barriers to Discharge/Transition Planning: Assessment/stabilization in progress    Progress Note    Plan  SW/CM - Recommendations for Discharge:  return home with medications as prescribed  PT - Recommendations for Discharge:    OT - Recommendations for Discharge:    SLP - Recommendations for Discharge:    Anticipate patient will need post-hospital services. Necessary services are available.  Anticipate patient can return to the environment from which patient entered the hospital.   Anticipate patient can provide self-care at discharge.    Refer to SW/CM Flowsheet for Goals and objective data.      1.91

## 2019-08-08 NOTE — ED PROVIDER NOTE - OBJECTIVE STATEMENT
53 yo female PMHx MVP, GERD, Landin's esophagus, kidney stones s/p lithotripsy in past presents to the ED c/o epigastric pain w/ associated nausea that began last night. Pt scheduled to have ureteral stent placed tomorrow by Dr. Hoenig, was having worsening of her baseline L sided abd/flank pain that she has from her stone all day yesterday however over night pt had new epigastric pain which was sever, no radiation, different from her stone pain. 53 yo female PMHx MVP, GERD, Landin's esophagus, kidney stones s/p lithotripsy in past presents to the ED c/o epigastric pain w/ associated nausea that began last night. Pt scheduled to have ureteral stent placed tomorrow by Dr. Hoenig, was having worsening of her baseline L sided abd/flank pain that she has from her stone all day yesterday however over night pt had new epigastric pain which was sever, no radiation, different from her stone pain. Took omeprazole with mild relief, however pain has persisted, called Hoenig's office and was advised to come to ED. Reports mild associated sob w/ pain. Pain does not radiate to her back. Denies fever/chills, recent travel, recent hospitalization/surgery, diarrhea/constipation, fatigue, back pain, palpitations, speech/visual changes, numbness/tingling, melena, BRBPR, hematuria, chest pain.

## 2019-08-08 NOTE — ED PROVIDER NOTE - PROGRESS NOTE DETAILS
Uro at bedside, states if ED workup unremarkable then can admit to Dr. Hoenig as pt already scheduled for procedure tomorrow. attending aware. - Jax Miranda PA-C Pt feels well s/p meds. Unremarkable ED workup, US negative. Will d/w uro. - Jax Miranda PA-C Spoke w/ uro regarding unremarkable workup thus far in ED and improvement of pain. They will d/w attending and admit. - VIRGINIA LintonC Uro evaluated pt w/ Dr. Hoenig, requested admission under his service. Additionally requesting ancef. Pt intolerated to keflex (reports nausea), no allergy. Uro aware, still requesting ancef. Attending aware. Pt stable for admission. - Jax Miranda PA-C

## 2019-08-09 ENCOUNTER — APPOINTMENT (OUTPATIENT)
Dept: UROLOGY | Facility: HOSPITAL | Age: 54
End: 2019-08-09

## 2019-08-09 ENCOUNTER — RESULT REVIEW (OUTPATIENT)
Age: 54
End: 2019-08-09

## 2019-08-09 ENCOUNTER — TRANSCRIPTION ENCOUNTER (OUTPATIENT)
Age: 54
End: 2019-08-09

## 2019-08-09 LAB
CULTURE RESULTS: SIGNIFICANT CHANGE UP
SPECIMEN SOURCE: SIGNIFICANT CHANGE UP

## 2019-08-09 PROCEDURE — 74420 UROGRAPHY RTRGR +-KUB: CPT | Mod: 26

## 2019-08-09 PROCEDURE — 88300 SURGICAL PATH GROSS: CPT | Mod: 26

## 2019-08-09 PROCEDURE — 52332 CYSTOSCOPY AND TREATMENT: CPT | Mod: LT

## 2019-08-09 PROCEDURE — 52352 CYSTOURETERO W/STONE REMOVE: CPT | Mod: LT

## 2019-08-09 RX ORDER — TAMSULOSIN HYDROCHLORIDE 0.4 MG/1
1 CAPSULE ORAL
Qty: 7 | Refills: 0
Start: 2019-08-09 | End: 2019-08-15

## 2019-08-09 RX ORDER — CEPHALEXIN 500 MG
1 CAPSULE ORAL
Qty: 6 | Refills: 0
Start: 2019-08-09 | End: 2019-08-11

## 2019-08-09 RX ORDER — OXYCODONE AND ACETAMINOPHEN 5; 325 MG/1; MG/1
1 TABLET ORAL EVERY 4 HOURS
Refills: 0 | Status: DISCONTINUED | OUTPATIENT
Start: 2019-08-09 | End: 2019-08-10

## 2019-08-09 RX ORDER — TAMSULOSIN HYDROCHLORIDE 0.4 MG/1
1 CAPSULE ORAL
Qty: 0 | Refills: 0 | DISCHARGE
Start: 2019-08-09

## 2019-08-09 RX ORDER — HYDROMORPHONE HYDROCHLORIDE 2 MG/ML
0.5 INJECTION INTRAMUSCULAR; INTRAVENOUS; SUBCUTANEOUS
Refills: 0 | Status: DISCONTINUED | OUTPATIENT
Start: 2019-08-09 | End: 2019-08-09

## 2019-08-09 RX ORDER — PHENAZOPYRIDINE HCL 100 MG
100 TABLET ORAL EVERY 8 HOURS
Refills: 0 | Status: DISCONTINUED | OUTPATIENT
Start: 2019-08-09 | End: 2019-08-10

## 2019-08-09 RX ORDER — LORATADINE 10 MG/1
10 TABLET ORAL ONCE
Refills: 0 | Status: COMPLETED | OUTPATIENT
Start: 2019-08-09 | End: 2019-08-09

## 2019-08-09 RX ORDER — HEPARIN SODIUM 5000 [USP'U]/ML
5000 INJECTION INTRAVENOUS; SUBCUTANEOUS EVERY 12 HOURS
Refills: 0 | Status: DISCONTINUED | OUTPATIENT
Start: 2019-08-09 | End: 2019-08-10

## 2019-08-09 RX ORDER — CEFAZOLIN SODIUM 1 G
1000 VIAL (EA) INJECTION EVERY 8 HOURS
Refills: 0 | Status: DISCONTINUED | OUTPATIENT
Start: 2019-08-09 | End: 2019-08-10

## 2019-08-09 RX ORDER — HYDROMORPHONE HYDROCHLORIDE 2 MG/ML
0.25 INJECTION INTRAMUSCULAR; INTRAVENOUS; SUBCUTANEOUS
Refills: 0 | Status: DISCONTINUED | OUTPATIENT
Start: 2019-08-09 | End: 2019-08-09

## 2019-08-09 RX ORDER — AZTREONAM 2 G
1 VIAL (EA) INJECTION
Qty: 6 | Refills: 0
Start: 2019-08-09 | End: 2019-08-11

## 2019-08-09 RX ORDER — SENNA PLUS 8.6 MG/1
2 TABLET ORAL
Qty: 0 | Refills: 0 | DISCHARGE
Start: 2019-08-09

## 2019-08-09 RX ORDER — DOCUSATE SODIUM 100 MG
100 CAPSULE ORAL THREE TIMES A DAY
Refills: 0 | Status: DISCONTINUED | OUTPATIENT
Start: 2019-08-09 | End: 2019-08-10

## 2019-08-09 RX ORDER — OXYCODONE HYDROCHLORIDE 5 MG/1
1 TABLET ORAL
Qty: 9 | Refills: 0
Start: 2019-08-09 | End: 2019-08-11

## 2019-08-09 RX ORDER — ONDANSETRON 8 MG/1
4 TABLET, FILM COATED ORAL ONCE
Refills: 0 | Status: DISCONTINUED | OUTPATIENT
Start: 2019-08-09 | End: 2019-08-09

## 2019-08-09 RX ORDER — DOCUSATE SODIUM 100 MG
1 CAPSULE ORAL
Qty: 0 | Refills: 0 | DISCHARGE
Start: 2019-08-09

## 2019-08-09 RX ORDER — SENNA PLUS 8.6 MG/1
2 TABLET ORAL AT BEDTIME
Refills: 0 | Status: DISCONTINUED | OUTPATIENT
Start: 2019-08-09 | End: 2019-08-10

## 2019-08-09 RX ORDER — TAMSULOSIN HYDROCHLORIDE 0.4 MG/1
0.4 CAPSULE ORAL AT BEDTIME
Refills: 0 | Status: DISCONTINUED | OUTPATIENT
Start: 2019-08-09 | End: 2019-08-10

## 2019-08-09 RX ORDER — ONDANSETRON 8 MG/1
4 TABLET, FILM COATED ORAL EVERY 6 HOURS
Refills: 0 | Status: DISCONTINUED | OUTPATIENT
Start: 2019-08-09 | End: 2019-08-10

## 2019-08-09 RX ADMIN — HEPARIN SODIUM 5000 UNIT(S): 5000 INJECTION INTRAVENOUS; SUBCUTANEOUS at 06:01

## 2019-08-09 RX ADMIN — OXYCODONE AND ACETAMINOPHEN 1 TABLET(S): 5; 325 TABLET ORAL at 18:56

## 2019-08-09 RX ADMIN — Medication 100 MILLIGRAM(S): at 21:02

## 2019-08-09 RX ADMIN — HYDROMORPHONE HYDROCHLORIDE 0.25 MILLIGRAM(S): 2 INJECTION INTRAMUSCULAR; INTRAVENOUS; SUBCUTANEOUS at 13:45

## 2019-08-09 RX ADMIN — Medication 100 MILLIGRAM(S): at 06:03

## 2019-08-09 RX ADMIN — OXYCODONE AND ACETAMINOPHEN 1 TABLET(S): 5; 325 TABLET ORAL at 19:15

## 2019-08-09 RX ADMIN — OXYCODONE AND ACETAMINOPHEN 1 TABLET(S): 5; 325 TABLET ORAL at 22:55

## 2019-08-09 RX ADMIN — SODIUM CHLORIDE 125 MILLILITER(S): 9 INJECTION INTRAMUSCULAR; INTRAVENOUS; SUBCUTANEOUS at 09:25

## 2019-08-09 RX ADMIN — TAMSULOSIN HYDROCHLORIDE 0.4 MILLIGRAM(S): 0.4 CAPSULE ORAL at 21:01

## 2019-08-09 RX ADMIN — HYDROMORPHONE HYDROCHLORIDE 0.25 MILLIGRAM(S): 2 INJECTION INTRAMUSCULAR; INTRAVENOUS; SUBCUTANEOUS at 14:00

## 2019-08-09 RX ADMIN — OXYCODONE AND ACETAMINOPHEN 1 TABLET(S): 5; 325 TABLET ORAL at 23:25

## 2019-08-09 RX ADMIN — LORATADINE 10 MILLIGRAM(S): 10 TABLET ORAL at 09:25

## 2019-08-09 RX ADMIN — HEPARIN SODIUM 5000 UNIT(S): 5000 INJECTION INTRAVENOUS; SUBCUTANEOUS at 17:30

## 2019-08-09 RX ADMIN — Medication 100 MILLIGRAM(S): at 21:01

## 2019-08-09 RX ADMIN — Medication 100 MILLIGRAM(S): at 06:01

## 2019-08-09 NOTE — PROGRESS NOTE ADULT - ASSESSMENT
54 year old female with left ureteral calculus    -keep NPO for ureteroscopy this morning  -continue ancef  -analgesia as needed  -OOB/DVT prophylaxis  -discharge planning after procedure

## 2019-08-09 NOTE — BRIEF OPERATIVE NOTE - OPERATION/FINDINGS
1. Previously imaged L sided ureteral stone likely displaced into kidney  2. All fragments basketed, no large >2 mm stone fragments remaining    Dictation: [id number]

## 2019-08-09 NOTE — DISCHARGE NOTE PROVIDER - HOSPITAL COURSE
This is a 54 year old female who was admitted for pain, and underwent a scheduled left ureteroscopy and left stent placement the next day.  She did well postoperatively and remained hemodynamically stable.  She was also tolerating regular diet and was pain controlled.  She was discharged home with appropriate prescriptions and instructions for follow up.

## 2019-08-09 NOTE — DISCHARGE NOTE PROVIDER - NSDCCPCAREPLAN_GEN_ALL_CORE_FT
PRINCIPAL DISCHARGE DIAGNOSIS  Diagnosis: Nephrolithiasis  Assessment and Plan of Treatment: You had the stones removed from your left kidney and were left with a stent on a string.  Make sure the string does not get pulled.  Take tylenol as needed for pain control.  Follow up with Dr Hoenig in office in 1 week for stent removal.      SECONDARY DISCHARGE DIAGNOSES  Diagnosis: Chronic GERD  Assessment and Plan of Treatment: Take omeprazole

## 2019-08-09 NOTE — DISCHARGE NOTE PROVIDER - NSDCCPTREATMENT_GEN_ALL_CORE_FT
PRINCIPAL PROCEDURE  Procedure: Cystourethroscopy with lithotripsy  Findings and Treatment: left side

## 2019-08-09 NOTE — DISCHARGE NOTE PROVIDER - CARE PROVIDER_API CALL
Hoenig, David M (MD)  Urology  [facility] Dept of Urology, [address] [place] [address]  Phone: [phone]  Fax: [phone]  Follow Up Time: 1 week

## 2019-08-09 NOTE — PROGRESS NOTE ADULT - ASSESSMENT
A/P: 54y Female s/p right URS   DVT prophylaxis/OOB  Incentive spirometry  Strict I&O's  Analgesia as needed  Diet-regular   Pt cleared for discharge however requesting to stay overnight because of dysuria, discharge home in the morning

## 2019-08-09 NOTE — PROGRESS NOTE ADULT - SUBJECTIVE AND OBJECTIVE BOX
Post op Check    Pt seen and examined without complaints. Pain is controlled. Denies SOB/CP/N/V. C/o dysuria otherwise voiding well.     Vital Signs Last 24 Hrs  T(C): 36.8 (09 Aug 2019 17:15), Max: 37 (09 Aug 2019 16:15)  T(F): 98.2 (09 Aug 2019 17:15), Max: 98.6 (09 Aug 2019 16:15)  HR: 77 (09 Aug 2019 17:15) (50 - 77)  BP: 121/83 (09 Aug 2019 17:15) (100/67 - 134/68)  BP(mean): 92 (09 Aug 2019 14:15) (85 - 102)  RR: 189 (09 Aug 2019 17:15) (14 - 189)  SpO2: 96% (09 Aug 2019 16:15) (95% - 100%)    I&O's Summary    08 Aug 2019 07:01  -  09 Aug 2019 07:00  --------------------------------------------------------  IN: 1000 mL / OUT: 400 mL / NET: 600 mL    09 Aug 2019 07:01  -  09 Aug 2019 19:07  --------------------------------------------------------  IN: 375 mL / OUT: 900 mL / NET: -525 mL    Physical Exam  Gen: NAD, A&Ox3  Pulm: No respiratory distress, no subcostal retractions  CV: RRR, no JVD  Abd: Soft, NT, ND  Back: no CVAT                            13.7   9.5   )-----------( 192      ( 08 Aug 2019 11:28 )             40.6       08-08    138  |  102  |  12  ----------------------------<  114<H>  4.5   |  22  |  0.60    Ca    9.5      08 Aug 2019 11:28    TPro  7.7  /  Alb  4.2  /  TBili  0.6  /  DBili  x   /  AST  18  /  ALT  25  /  AlkPhos  76  08-08

## 2019-08-09 NOTE — DISCHARGE NOTE PROVIDER - CARE PROVIDERS DIRECT ADDRESSES
[email or web address]

## 2019-08-09 NOTE — PROVIDER CONTACT NOTE (OTHER) - SITUATION
pt c/o pain during urination, blood tinged urine noted. pt states that she does not feel well enough to go home (d/c home order in computer)

## 2019-08-09 NOTE — DISCHARGE NOTE PROVIDER - NSDCACTIVITY_GEN_ALL_CORE
Showering allowed/Stairs allowed/Walking - Outdoors allowed/Walking - Indoors allowed/No heavy lifting/straining

## 2019-08-09 NOTE — PROGRESS NOTE ADULT - SUBJECTIVE AND OBJECTIVE BOX
The patient was seen and examined at bedside.  Denies complaints of chest pain, shortness of breath, nausea, acute pain.    T(C): 36.4 (08-09-19 @ 05:33), Max: 36.7 (08-08-19 @ 10:25)  HR: 60 (08-09-19 @ 05:33) (54 - 86)  BP: 106/68 (08-09-19 @ 05:33) (100/67 - 118/83)  RR: 18 (08-09-19 @ 05:33) (16 - 20)  SpO2: 98% (08-09-19 @ 05:33) (96% - 99%)  Wt(kg): --    Physical Exam:    General: NAD, A+Ox3  Abdomen: soft, non-tender, non-distended  Back: no CVAT bilaterally      08-08 @ 07:01  -  08-09 @ 07:00  --------------------------------------------------------  IN: 1000 mL / OUT: 400 mL / NET: 600 mL    void - 400cc

## 2019-08-10 ENCOUNTER — TRANSCRIPTION ENCOUNTER (OUTPATIENT)
Age: 54
End: 2019-08-10

## 2019-08-10 VITALS
TEMPERATURE: 98 F | OXYGEN SATURATION: 97 % | HEART RATE: 69 BPM | RESPIRATION RATE: 18 BRPM | DIASTOLIC BLOOD PRESSURE: 85 MMHG | SYSTOLIC BLOOD PRESSURE: 114 MMHG

## 2019-08-10 LAB — GLUCOSE BLDC GLUCOMTR-MCNC: 128 MG/DL — HIGH (ref 70–99)

## 2019-08-10 PROCEDURE — 87086 URINE CULTURE/COLONY COUNT: CPT

## 2019-08-10 PROCEDURE — 71046 X-RAY EXAM CHEST 2 VIEWS: CPT

## 2019-08-10 PROCEDURE — 76705 ECHO EXAM OF ABDOMEN: CPT

## 2019-08-10 PROCEDURE — C2625: CPT

## 2019-08-10 PROCEDURE — 96374 THER/PROPH/DIAG INJ IV PUSH: CPT

## 2019-08-10 PROCEDURE — 81025 URINE PREGNANCY TEST: CPT

## 2019-08-10 PROCEDURE — 84484 ASSAY OF TROPONIN QUANT: CPT

## 2019-08-10 PROCEDURE — C1889: CPT

## 2019-08-10 PROCEDURE — 85610 PROTHROMBIN TIME: CPT

## 2019-08-10 PROCEDURE — 93005 ELECTROCARDIOGRAM TRACING: CPT

## 2019-08-10 PROCEDURE — C1769: CPT

## 2019-08-10 PROCEDURE — 99285 EMERGENCY DEPT VISIT HI MDM: CPT | Mod: 25

## 2019-08-10 PROCEDURE — 88300 SURGICAL PATH GROSS: CPT

## 2019-08-10 PROCEDURE — 82330 ASSAY OF CALCIUM: CPT

## 2019-08-10 PROCEDURE — 80053 COMPREHEN METABOLIC PANEL: CPT

## 2019-08-10 PROCEDURE — 85730 THROMBOPLASTIN TIME PARTIAL: CPT

## 2019-08-10 PROCEDURE — 96375 TX/PRO/DX INJ NEW DRUG ADDON: CPT

## 2019-08-10 PROCEDURE — 82947 ASSAY GLUCOSE BLOOD QUANT: CPT

## 2019-08-10 PROCEDURE — 99238 HOSP IP/OBS DSCHRG MGMT 30/<: CPT

## 2019-08-10 PROCEDURE — 76000 FLUOROSCOPY <1 HR PHYS/QHP: CPT

## 2019-08-10 PROCEDURE — 84132 ASSAY OF SERUM POTASSIUM: CPT

## 2019-08-10 PROCEDURE — 83605 ASSAY OF LACTIC ACID: CPT

## 2019-08-10 PROCEDURE — 82962 GLUCOSE BLOOD TEST: CPT

## 2019-08-10 PROCEDURE — C1758: CPT

## 2019-08-10 PROCEDURE — 85014 HEMATOCRIT: CPT

## 2019-08-10 PROCEDURE — 82803 BLOOD GASES ANY COMBINATION: CPT

## 2019-08-10 PROCEDURE — 82365 CALCULUS SPECTROSCOPY: CPT

## 2019-08-10 PROCEDURE — 82435 ASSAY OF BLOOD CHLORIDE: CPT

## 2019-08-10 PROCEDURE — 85027 COMPLETE CBC AUTOMATED: CPT

## 2019-08-10 PROCEDURE — 84295 ASSAY OF SERUM SODIUM: CPT

## 2019-08-10 PROCEDURE — 81001 URINALYSIS AUTO W/SCOPE: CPT

## 2019-08-10 PROCEDURE — 83690 ASSAY OF LIPASE: CPT

## 2019-08-10 RX ADMIN — Medication 100 MILLIGRAM(S): at 14:14

## 2019-08-10 RX ADMIN — OXYCODONE AND ACETAMINOPHEN 1 TABLET(S): 5; 325 TABLET ORAL at 06:38

## 2019-08-10 RX ADMIN — Medication 100 MILLIGRAM(S): at 14:16

## 2019-08-10 RX ADMIN — SENNA PLUS 2 TABLET(S): 8.6 TABLET ORAL at 06:08

## 2019-08-10 RX ADMIN — HEPARIN SODIUM 5000 UNIT(S): 5000 INJECTION INTRAVENOUS; SUBCUTANEOUS at 06:08

## 2019-08-10 RX ADMIN — Medication 100 MILLIGRAM(S): at 06:08

## 2019-08-10 RX ADMIN — OXYCODONE AND ACETAMINOPHEN 1 TABLET(S): 5; 325 TABLET ORAL at 06:08

## 2019-08-10 NOTE — PROGRESS NOTE ADULT - SUBJECTIVE AND OBJECTIVE BOX
UROLOGY DAILY PROGRESS NOTE:     Subjective: Patient seen and examined at bedside. No overnight events.       Objective:  Vital signs  T(F): , Max: 98.7 (08-09-19 @ 21:27)  HR: 62 (08-10-19 @ 05:40)  BP: 119/77 (08-10-19 @ 05:40)  SpO2: 96% (08-10-19 @ 05:40)  Wt(kg): --    I&O's Summary    08 Aug 2019 07:01  -  09 Aug 2019 07:00  --------------------------------------------------------  IN: 1000 mL / OUT: 400 mL / NET: 600 mL    09 Aug 2019 07:01  -  10 Aug 2019 06:43  --------------------------------------------------------  IN: 615 mL / OUT: 2800 mL / NET: -2185 mL        Gen: NAD  Pulm: No respiratory distress, no subcostal retractions  CV: RRR, no JVD  Abd: Soft, NT, ND  :    Labs:  08-08  9.5   / 40.6  /0.60                           13.7   9.5   )-----------( 192      ( 08 Aug 2019 11:28 )             40.6     08-08    138  |  102  |  12  ----------------------------<  114<H>  4.5   |  22  |  0.60    Ca    9.5      08 Aug 2019 11:28    TPro  7.7  /  Alb  4.2  /  TBili  0.6  /  DBili  x   /  AST  18  /  ALT  25  /  AlkPhos  76  08-08    PT/INR - ( 08 Aug 2019 11:28 )   PT: 12.3 sec;   INR: 1.07 ratio         PTT - ( 08 Aug 2019 11:28 )  PTT:26.9 sec    Urine Cx: contaminated UROLOGY DAILY PROGRESS NOTE:     Subjective: Patient seen and examined at bedside. No overnight events.       Objective:  Vital signs  T(F): , Max: 98.7 (08-09-19 @ 21:27)  HR: 62 (08-10-19 @ 05:40)  BP: 119/77 (08-10-19 @ 05:40)  SpO2: 96% (08-10-19 @ 05:40)  Wt(kg): --    I&O's Summary    08 Aug 2019 07:01  -  09 Aug 2019 07:00  --------------------------------------------------------  IN: 1000 mL / OUT: 400 mL / NET: 600 mL    09 Aug 2019 07:01  -  10 Aug 2019 06:43  --------------------------------------------------------  IN: 615 mL / OUT: 2800 mL / NET: -2185 mL        Gen: NAD  Pulm: No respiratory distress, no subcostal retractions  CV: RRR, no JVD  Abd: Soft, NT, ND  : zaidi out, voiding    Labs:  08-08  9.5   / 40.6  /0.60                           13.7   9.5   )-----------( 192      ( 08 Aug 2019 11:28 )             40.6     08-08    138  |  102  |  12  ----------------------------<  114<H>  4.5   |  22  |  0.60    Ca    9.5      08 Aug 2019 11:28    TPro  7.7  /  Alb  4.2  /  TBili  0.6  /  DBili  x   /  AST  18  /  ALT  25  /  AlkPhos  76  08-08    PT/INR - ( 08 Aug 2019 11:28 )   PT: 12.3 sec;   INR: 1.07 ratio         PTT - ( 08 Aug 2019 11:28 )  PTT:26.9 sec    Urine Cx: contaminated

## 2019-08-10 NOTE — DISCHARGE NOTE NURSING/CASE MANAGEMENT/SOCIAL WORK - NSDCDPATPORTLINK_GEN_ALL_CORE
You can access the [facility] Patient Portal, offered by [facility], by registering with the following website: [email or web address]

## 2019-08-10 NOTE — PROGRESS NOTE ADULT - ASSESSMENT
A/P: 54y Female s/p right URS   *incomplete*  DVT prophylaxis/OOB  Incentive spirometry  Strict I&O's  Analgesia as needed  Diet-regular   Pt cleared for discharge home this morning A/P: 54y Female s/p right URS on POD2    DVT prophylaxis/OOB  Incentive spirometry  Strict I&O's  Analgesia as needed  Diet-regular   Pt cleared for discharge home this morning

## 2019-08-14 ENCOUNTER — APPOINTMENT (OUTPATIENT)
Dept: UROLOGY | Facility: CLINIC | Age: 54
End: 2019-08-14
Payer: MEDICAID

## 2019-08-14 LAB — SURGICAL PATHOLOGY STUDY: SIGNIFICANT CHANGE UP

## 2019-08-14 PROCEDURE — 99214 OFFICE O/P EST MOD 30 MIN: CPT

## 2019-08-14 NOTE — PHYSICAL EXAM
[General Appearance - Well Developed] : well developed [General Appearance - Well Nourished] : well nourished [Normal Appearance] : normal appearance [Well Groomed] : well groomed [General Appearance - In No Acute Distress] : no acute distress [Abdomen Soft] : soft [Abdomen Tenderness] : non-tender [Costovertebral Angle Tenderness] : no ~M costovertebral angle tenderness [External Female Genitalia] : normal external genitalia [Oriented To Time, Place, And Person] : oriented to person, place, and time [Affect] : the affect was normal [Mood] : the mood was normal [Not Anxious] : not anxious [Normal Station and Gait] : the gait and station were normal for the patient's age [No Focal Deficits] : no focal deficits [FreeTextEntry1] : strings from stent

## 2019-08-14 NOTE — HISTORY OF PRESENT ILLNESS
[Currently Experiencing ___] :  [unfilled] [FreeTextEntry1] : Pt returns for metabolic evaluation and prevention of future stone disease following recent surgery for stone.  At issue today is review of the stone analysis, risk factors for future stone episodes and establishing whether they have pure dietary, metabolic, or mixed causes for their stone risks which is unrelated to the surgical management of their stone disease.\par \par They underwent left URS with laser and stone removal, left ureteral and renal calculi on 8/9/19\par \par Stent status: in, on strings.\par \par Stone analysis: pending\par previously mixed calcium oxalate

## 2019-08-14 NOTE — ASSESSMENT
[FreeTextEntry1] : stent removed without difficulty- joshua well.  Post stent removal expectations reviewed.\par \par Diet modification reviewed at length- increasing fluids (primarily water), citrus is good, and decreasing/moderating salt, animal flesh protein, oxalate containing foods, and moderation of calcium intake (1000 mg/day is USRDA).\par \par I reviewed with the patient the risks of metabolic stone disease given their underlying risk parameters (all of which include large stones, multiple stones, bilateral stones, family history, and young age), and the indications for 24 hour urine metabolic assessment.\par \par We also discussed benefits of regular exercise and weight loss as independent risk reducers for stones.\par \par 1. Diet modification as discussed\par 2. 24 hour urine collection x 2 in the next few weeks\par 3. RTC with renal US in 8 to 10 weeks\par 4. MRI renal w/wo IV contrast to eval complex cyst vs. small mass\par

## 2019-08-15 ENCOUNTER — OTHER (OUTPATIENT)
Age: 54
End: 2019-08-15

## 2019-08-16 ENCOUNTER — OTHER (OUTPATIENT)
Age: 54
End: 2019-08-16

## 2019-08-16 LAB — COMPN STONE: SIGNIFICANT CHANGE UP

## 2019-08-24 ENCOUNTER — FORM ENCOUNTER (OUTPATIENT)
Age: 54
End: 2019-08-24

## 2019-08-25 ENCOUNTER — OUTPATIENT (OUTPATIENT)
Dept: OUTPATIENT SERVICES | Facility: HOSPITAL | Age: 54
LOS: 1 days | End: 2019-08-25
Payer: MEDICAID

## 2019-08-25 ENCOUNTER — APPOINTMENT (OUTPATIENT)
Dept: MRI IMAGING | Facility: IMAGING CENTER | Age: 54
End: 2019-08-25
Payer: MEDICAID

## 2019-08-25 DIAGNOSIS — Z98.890 OTHER SPECIFIED POSTPROCEDURAL STATES: Chronic | ICD-10-CM

## 2019-08-25 DIAGNOSIS — N28.1 CYST OF KIDNEY, ACQUIRED: ICD-10-CM

## 2019-08-25 PROBLEM — G47.33 OBSTRUCTIVE SLEEP APNEA (ADULT) (PEDIATRIC): Chronic | Status: ACTIVE | Noted: 2019-08-05

## 2019-08-25 PROBLEM — N20.0 CALCULUS OF KIDNEY: Chronic | Status: ACTIVE | Noted: 2019-07-06

## 2019-08-25 PROBLEM — K22.70 BARRETT'S ESOPHAGUS WITHOUT DYSPLASIA: Chronic | Status: ACTIVE | Noted: 2019-08-05

## 2019-08-25 PROBLEM — K21.9 GASTRO-ESOPHAGEAL REFLUX DISEASE WITHOUT ESOPHAGITIS: Chronic | Status: ACTIVE | Noted: 2019-08-05

## 2019-08-25 PROBLEM — I34.1 NONRHEUMATIC MITRAL (VALVE) PROLAPSE: Chronic | Status: ACTIVE | Noted: 2019-07-06

## 2019-08-25 PROCEDURE — 74183 MRI ABD W/O CNTR FLWD CNTR: CPT | Mod: 26

## 2019-08-25 PROCEDURE — 74183 MRI ABD W/O CNTR FLWD CNTR: CPT

## 2019-08-25 PROCEDURE — A9585: CPT

## 2019-10-14 LAB
ANION GAP SERPL CALC-SCNC: 11 MMOL/L
BUN SERPL-MCNC: 11 MG/DL
CALCIUM ?TM UR-MCNC: 11.9 MG/DL
CALCIUM SERPL-MCNC: 9.7 MG/DL
CALCIUM SERPL-MCNC: 9.7 MG/DL
CALCIUM/CREAT UR: 0.2 RATIO
CHLORIDE SERPL-SCNC: 104 MMOL/L
CO2 SERPL-SCNC: 28 MMOL/L
CREAT SERPL-MCNC: 0.7 MG/DL
CREAT SPEC-SCNC: 74 MG/DL
GLUCOSE SERPL-MCNC: 95 MG/DL
PARATHYROID HORMONE INTACT: 40 PG/ML
POTASSIUM SERPL-SCNC: 5.3 MMOL/L
SODIUM SERPL-SCNC: 143 MMOL/L

## 2019-10-16 ENCOUNTER — APPOINTMENT (OUTPATIENT)
Dept: UROLOGY | Facility: CLINIC | Age: 54
End: 2019-10-16
Payer: MEDICAID

## 2019-10-16 ENCOUNTER — OUTPATIENT (OUTPATIENT)
Dept: OUTPATIENT SERVICES | Facility: HOSPITAL | Age: 54
LOS: 1 days | End: 2019-10-16
Payer: MEDICAID

## 2019-10-16 DIAGNOSIS — Z98.890 OTHER SPECIFIED POSTPROCEDURAL STATES: Chronic | ICD-10-CM

## 2019-10-16 DIAGNOSIS — N20.1 CALCULUS OF URETER: ICD-10-CM

## 2019-10-16 DIAGNOSIS — R35.0 FREQUENCY OF MICTURITION: ICD-10-CM

## 2019-10-16 PROCEDURE — 76775 US EXAM ABDO BACK WALL LIM: CPT

## 2019-10-16 PROCEDURE — 76775 US EXAM ABDO BACK WALL LIM: CPT | Mod: 26

## 2019-10-16 PROCEDURE — 99214 OFFICE O/P EST MOD 30 MIN: CPT | Mod: 25

## 2019-10-16 NOTE — PHYSICAL EXAM
[General Appearance - Well Developed] : well developed [Normal Appearance] : normal appearance [General Appearance - Well Nourished] : well nourished [Well Groomed] : well groomed [Abdomen Soft] : soft [General Appearance - In No Acute Distress] : no acute distress [] : no respiratory distress [Abdomen Tenderness] : non-tender [Costovertebral Angle Tenderness] : no ~M costovertebral angle tenderness [Respiration, Rhythm And Depth] : normal respiratory rhythm and effort [Exaggerated Use Of Accessory Muscles For Inspiration] : no accessory muscle use [Oriented To Time, Place, And Person] : oriented to person, place, and time [Mood] : the mood was normal [Affect] : the affect was normal [Not Anxious] : not anxious [Normal Station and Gait] : the gait and station were normal for the patient's age [No Focal Deficits] : no focal deficits

## 2019-10-16 NOTE — ASSESSMENT
[FreeTextEntry1] : Renal US reviewed- likely small stone residual or parenchymal given comparison to intraoperative films in left lower pole, no shadowing but twinkle artifact ~ 7 mm.  Right side with likely stone, again with twinkle but with shadowing, up to ~ 6 mm.  No hydro.  Hemorrhagic or proteinaceous cyst left ~ 1.7 x 1.9 cm, and thinly septated cyst (or two adjacent cysts) right lower pole, both without doppler flow to suggest additional complexity.\par \par MRI reviewed- low risk for sig complexity: MRI- no evidence of malignancy. non enhancing hemorrhagic or proteinaceous cysts\par \par 24 hour with hypercalciuria both days.- 252, 260, all else excellent including pH and citrate\par \par With some types of MSK, starting citrate therapy can help, but pt's citrate excellent and pH more alkaline and excellent without additional therapy.\par \par Hypercalciuria w/u:  BMP potassium 5.3, eGFR 100, creat 0.7, calcium 9.7, PTH 40\par Fasting urine Ca:cr= 0/16.... indicative of renal leak hypercalciuria.\par \par Calcium:Creatinine ratio = \par \par This is consistent with renal leak hypercalciuria as cause, in setting of normal PTH, calcium in blood.\par \par Recommend: \par \par 1. start hctz 25 mg (can start with 1/2 dose for several days)\par 2. check bmp in ~ 2 weeks\par 3. low sodium diet, reasonable potassium diet\par 4. repeat 24 hour urine in approx 4 to 6 weeks\par 5. RTC approx 8 weeks to review\par

## 2019-10-16 NOTE — HISTORY OF PRESENT ILLNESS
[Currently Experiencing ___] :  [unfilled] [FreeTextEntry1] : Pt returns for metabolic evaluation and prevention of future stone disease following recent surgery for stone.  At issue today is review of the stone analysis, risk factors for future stone episodes and establishing whether they have pure dietary, metabolic, or mixed causes for their stone risks which is unrelated to the surgical management of their stone disease.\par \par They underwent left URS with laser and stone removal, left ureteral and renal calculi on 8/9/19.  Possible MSK appearance.\par \par Stone analysis: 100% Calcium oxalate monohydrate \par previously mixed calcium oxalate

## 2019-10-16 NOTE — LETTER BODY
[Dear  ___] : Dear  [unfilled], [Consult Letter:] : I had the pleasure of evaluating your patient, [unfilled]. [( Thank you for referring [unfilled] for consultation for _____ )] : Thank you for referring [unfilled] for consultation for [unfilled] [Consult Closing:] : Thank you very much for allowing me to participate in the care of this patient.  If you have any questions, please do not hesitate to contact me. [Please see my note below.] : Please see my note below. [Sincerely,] : Sincerely, [FreeTextEntry1] : Pt is 53 yo female with stone, possible Medullary sponge kidney.  Pt returns for metabolic evaluation and prevention of future stone disease following recent surgery for stone.  At issue today is review of the stone analysis, risk factors for future stone episodes and establishing whether they have pure dietary, metabolic, or mixed causes for their stone risks which is unrelated to the surgical management of their stone disease.\par \par They underwent left URS with laser and stone removal, left ureteral and renal calculi on 8/9/19.  Possible MSK appearance.\par \par Stone analysis: 100% Calcium oxalate monohydrate \par previously mixed calcium oxalate\par \par Renal US reviewed- likely small stone residual or parenchymal given comparison to intraoperative films in left lower pole, no shadowing but twinkle artifact ~ 7 mm.  Right side with likely stone, again with twinkle but with shadowing, up to ~ 6 mm.  No hydro.  Hemorrhagic or proteinaceous cyst left ~ 1.7 x 1.9 cm, and thinly septated cyst (or two adjacent cysts) right lower pole, both without doppler flow to suggest additional complexity.\par \par MRI reviewed- low risk for sig complexity: MRI- no evidence of malignancy. non enhancing hemorrhagic or proteinaceous cysts\par \par 24 hour with hypercalciuria both days.- 252, 260, all else excellent including pH and citrate\par \par With some types of MSK, starting citrate therapy can help, but pt's citrate excellent and pH more alkaline and excellent without additional therapy.\par \par Hypercalciuria w/u:  BMP potassium 5.3, eGFR 100, creat 0.7, calcium 9.7, PTH 40\par Fasting urine Ca:cr= 0/16.... indicative of renal leak hypercalciuria.\par \par Calcium:Creatinine ratio = 0.16\par \par This is consistent with renal leak hypercalciuria as cause, in setting of normal PTH, calcium in blood.\par \par Recommend: \par \par 1. start hctz 25 mg (can start with 1/2 dose for several days)\par 2. check bmp in ~ 2 weeks\par 3. low sodium diet, reasonable potassium diet\par 4. repeat 24 hour urine in approx 4 to 6 weeks\par 5. RTC approx 8 weeks to review\par \par \par \par

## 2019-10-22 DIAGNOSIS — N28.1 CYST OF KIDNEY, ACQUIRED: ICD-10-CM

## 2019-10-22 DIAGNOSIS — R82.994 HYPERCALCIURIA: ICD-10-CM

## 2019-10-22 DIAGNOSIS — N20.1 CALCULUS OF URETER: ICD-10-CM

## 2019-10-22 DIAGNOSIS — N20.0 CALCULUS OF KIDNEY: ICD-10-CM

## 2019-11-09 ENCOUNTER — TRANSCRIPTION ENCOUNTER (OUTPATIENT)
Age: 54
End: 2019-11-09

## 2019-11-25 LAB
ANION GAP SERPL CALC-SCNC: 13 MMOL/L
BUN SERPL-MCNC: 20 MG/DL
CALCIUM SERPL-MCNC: 10.4 MG/DL
CHLORIDE SERPL-SCNC: 101 MMOL/L
CO2 SERPL-SCNC: 29 MMOL/L
CREAT SERPL-MCNC: 0.81 MG/DL
GLUCOSE SERPL-MCNC: 94 MG/DL
POTASSIUM SERPL-SCNC: 4.2 MMOL/L
SODIUM SERPL-SCNC: 143 MMOL/L

## 2019-12-05 ENCOUNTER — TRANSCRIPTION ENCOUNTER (OUTPATIENT)
Age: 54
End: 2019-12-05

## 2019-12-20 ENCOUNTER — APPOINTMENT (OUTPATIENT)
Dept: UROLOGY | Facility: CLINIC | Age: 54
End: 2019-12-20
Payer: MEDICAID

## 2019-12-20 PROCEDURE — 99214 OFFICE O/P EST MOD 30 MIN: CPT

## 2019-12-20 NOTE — PHYSICAL EXAM
[General Appearance - Well Developed] : well developed [Normal Appearance] : normal appearance [General Appearance - Well Nourished] : well nourished [Well Groomed] : well groomed [General Appearance - In No Acute Distress] : no acute distress [Abdomen Soft] : soft [Costovertebral Angle Tenderness] : no ~M costovertebral angle tenderness [Abdomen Tenderness] : non-tender [Urinary Bladder Findings] : the bladder was normal on palpation [] : no respiratory distress [Edema] : no peripheral edema [Exaggerated Use Of Accessory Muscles For Inspiration] : no accessory muscle use [Respiration, Rhythm And Depth] : normal respiratory rhythm and effort [Oriented To Time, Place, And Person] : oriented to person, place, and time [Mood] : the mood was normal [Affect] : the affect was normal [Normal Station and Gait] : the gait and station were normal for the patient's age [No Focal Deficits] : no focal deficits [Not Anxious] : not anxious

## 2019-12-20 NOTE — HISTORY OF PRESENT ILLNESS
[FreeTextEntry1] : Pt returns for f/u of hypercalciuria, possible MSK---> doing well at this time, though has felt 'dehydrated' recently.  Taking HCTZ 25. \par \par They underwent left URS with laser and stone removal, left ureteral and renal calculi on 8/9/19.  Possible MSK appearance.\par \par Stone analysis: 100% Calcium oxalate monohydrate \par previously mixed calcium oxalate\par Pt also notes difficulties with stress incontinence intermittent with cough and sneeze.\par \par Hypercalciuria w/u:  BMP potassium 5.3, eGFR 100, creat 0.7, calcium 9.7, PTH 40\par Fasting urine Ca:cr= 0/16.... indicative of renal leak hypercalciuria. [Currently Experiencing ___] :  [unfilled]

## 2019-12-20 NOTE — ASSESSMENT
[FreeTextEntry1] : Initial 24 hour with hypercalciuria both days.- 252, 260, all else excellent including pH and citrate---> f/u 24 hour urine on HCTZ 25 mg with calcium down to 189, but now with low volume < 1 liter and citrate low (likely volumet)\par \par With some types of MSK, starting citrate therapy can help, but pt's citrate excellent and pH more alkaline and excellent without additional therapy- will follow for now.\par \par 1. cont hctz 25 mg (can start with 1/2 dose for several days)\par 2. checking bloodwork next week\par 3. low sodium diet, reasonable potassium diet\par 4. RTC approx 6 months with renal us\par 5. kegel exercises recommended\par

## 2020-03-16 ENCOUNTER — TRANSCRIPTION ENCOUNTER (OUTPATIENT)
Age: 55
End: 2020-03-16

## 2020-03-20 ENCOUNTER — EMERGENCY (EMERGENCY)
Facility: HOSPITAL | Age: 55
LOS: 1 days | Discharge: HOME | End: 2020-03-20
Attending: EMERGENCY MEDICINE | Admitting: EMERGENCY MEDICINE
Payer: COMMERCIAL

## 2020-03-20 VITALS
OXYGEN SATURATION: 97 % | DIASTOLIC BLOOD PRESSURE: 74 MMHG | TEMPERATURE: 98 F | SYSTOLIC BLOOD PRESSURE: 130 MMHG | RESPIRATION RATE: 16 BRPM | HEART RATE: 67 BPM

## 2020-03-20 VITALS
OXYGEN SATURATION: 98 % | DIASTOLIC BLOOD PRESSURE: 83 MMHG | RESPIRATION RATE: 18 BRPM | HEART RATE: 70 BPM | SYSTOLIC BLOOD PRESSURE: 118 MMHG

## 2020-03-20 DIAGNOSIS — Z98.890 OTHER SPECIFIED POSTPROCEDURAL STATES: Chronic | ICD-10-CM

## 2020-03-20 LAB
ALBUMIN SERPL ELPH-MCNC: 4.1 G/DL — SIGNIFICANT CHANGE UP (ref 3.3–5)
ALP SERPL-CCNC: 72 U/L — SIGNIFICANT CHANGE UP (ref 40–120)
ALT FLD-CCNC: 21 U/L — SIGNIFICANT CHANGE UP (ref 4–33)
ANION GAP SERPL CALC-SCNC: 10 MMO/L — SIGNIFICANT CHANGE UP (ref 7–14)
AST SERPL-CCNC: 18 U/L — SIGNIFICANT CHANGE UP (ref 4–32)
B PERT DNA SPEC QL NAA+PROBE: NOT DETECTED — SIGNIFICANT CHANGE UP
BASOPHILS # BLD AUTO: 0.06 K/UL — SIGNIFICANT CHANGE UP (ref 0–0.2)
BASOPHILS NFR BLD AUTO: 1 % — SIGNIFICANT CHANGE UP (ref 0–2)
BILIRUB SERPL-MCNC: 0.5 MG/DL — SIGNIFICANT CHANGE UP (ref 0.2–1.2)
BUN SERPL-MCNC: 12 MG/DL — SIGNIFICANT CHANGE UP (ref 7–23)
C PNEUM DNA SPEC QL NAA+PROBE: NOT DETECTED — SIGNIFICANT CHANGE UP
CALCIUM SERPL-MCNC: 9.8 MG/DL — SIGNIFICANT CHANGE UP (ref 8.4–10.5)
CHLORIDE SERPL-SCNC: 106 MMOL/L — SIGNIFICANT CHANGE UP (ref 98–107)
CO2 SERPL-SCNC: 25 MMOL/L — SIGNIFICANT CHANGE UP (ref 22–31)
CREAT SERPL-MCNC: 0.69 MG/DL — SIGNIFICANT CHANGE UP (ref 0.5–1.3)
EOSINOPHIL # BLD AUTO: 0.06 K/UL — SIGNIFICANT CHANGE UP (ref 0–0.5)
EOSINOPHIL NFR BLD AUTO: 1 % — SIGNIFICANT CHANGE UP (ref 0–6)
FLU A RESULT: NOT DETECTED — SIGNIFICANT CHANGE UP
FLU A RESULT: NOT DETECTED — SIGNIFICANT CHANGE UP
FLUAV AG NPH QL: NOT DETECTED — SIGNIFICANT CHANGE UP
FLUAV H1 2009 PAND RNA SPEC QL NAA+PROBE: NOT DETECTED — SIGNIFICANT CHANGE UP
FLUAV H1 RNA SPEC QL NAA+PROBE: NOT DETECTED — SIGNIFICANT CHANGE UP
FLUAV H3 RNA SPEC QL NAA+PROBE: NOT DETECTED — SIGNIFICANT CHANGE UP
FLUAV SUBTYP SPEC NAA+PROBE: NOT DETECTED — SIGNIFICANT CHANGE UP
FLUBV AG NPH QL: NOT DETECTED — SIGNIFICANT CHANGE UP
FLUBV RNA SPEC QL NAA+PROBE: NOT DETECTED — SIGNIFICANT CHANGE UP
GLUCOSE SERPL-MCNC: 96 MG/DL — SIGNIFICANT CHANGE UP (ref 70–99)
HADV DNA SPEC QL NAA+PROBE: NOT DETECTED — SIGNIFICANT CHANGE UP
HCOV PNL SPEC NAA+PROBE: SIGNIFICANT CHANGE UP
HCT VFR BLD CALC: 38.8 % — SIGNIFICANT CHANGE UP (ref 34.5–45)
HGB BLD-MCNC: 12.9 G/DL — SIGNIFICANT CHANGE UP (ref 11.5–15.5)
HMPV RNA SPEC QL NAA+PROBE: NOT DETECTED — SIGNIFICANT CHANGE UP
HPIV1 RNA SPEC QL NAA+PROBE: NOT DETECTED — SIGNIFICANT CHANGE UP
HPIV2 RNA SPEC QL NAA+PROBE: NOT DETECTED — SIGNIFICANT CHANGE UP
HPIV3 RNA SPEC QL NAA+PROBE: NOT DETECTED — SIGNIFICANT CHANGE UP
HPIV4 RNA SPEC QL NAA+PROBE: NOT DETECTED — SIGNIFICANT CHANGE UP
IMM GRANULOCYTES NFR BLD AUTO: 0.3 % — SIGNIFICANT CHANGE UP (ref 0–1.5)
LYMPHOCYTES # BLD AUTO: 1.99 K/UL — SIGNIFICANT CHANGE UP (ref 1–3.3)
LYMPHOCYTES # BLD AUTO: 33.8 % — SIGNIFICANT CHANGE UP (ref 13–44)
MCHC RBC-ENTMCNC: 30.5 PG — SIGNIFICANT CHANGE UP (ref 27–34)
MCHC RBC-ENTMCNC: 33.2 % — SIGNIFICANT CHANGE UP (ref 32–36)
MCV RBC AUTO: 91.7 FL — SIGNIFICANT CHANGE UP (ref 80–100)
MONOCYTES # BLD AUTO: 0.32 K/UL — SIGNIFICANT CHANGE UP (ref 0–0.9)
MONOCYTES NFR BLD AUTO: 5.4 % — SIGNIFICANT CHANGE UP (ref 2–14)
NEUTROPHILS # BLD AUTO: 3.44 K/UL — SIGNIFICANT CHANGE UP (ref 1.8–7.4)
NEUTROPHILS NFR BLD AUTO: 58.5 % — SIGNIFICANT CHANGE UP (ref 43–77)
NRBC # FLD: 0 K/UL — SIGNIFICANT CHANGE UP (ref 0–0)
PLATELET # BLD AUTO: 199 K/UL — SIGNIFICANT CHANGE UP (ref 150–400)
PMV BLD: 9.9 FL — SIGNIFICANT CHANGE UP (ref 7–13)
POTASSIUM SERPL-MCNC: 4 MMOL/L — SIGNIFICANT CHANGE UP (ref 3.5–5.3)
POTASSIUM SERPL-SCNC: 4 MMOL/L — SIGNIFICANT CHANGE UP (ref 3.5–5.3)
PROT SERPL-MCNC: 7.4 G/DL — SIGNIFICANT CHANGE UP (ref 6–8.3)
RBC # BLD: 4.23 M/UL — SIGNIFICANT CHANGE UP (ref 3.8–5.2)
RBC # FLD: 11.8 % — SIGNIFICANT CHANGE UP (ref 10.3–14.5)
RSV RESULT: SIGNIFICANT CHANGE UP
RSV RNA RESP QL NAA+PROBE: SIGNIFICANT CHANGE UP
RSV RNA SPEC QL NAA+PROBE: NOT DETECTED — SIGNIFICANT CHANGE UP
RV+EV RNA SPEC QL NAA+PROBE: NOT DETECTED — SIGNIFICANT CHANGE UP
SODIUM SERPL-SCNC: 141 MMOL/L — SIGNIFICANT CHANGE UP (ref 135–145)
TROPONIN T, HIGH SENSITIVITY: < 6 NG/L — SIGNIFICANT CHANGE UP (ref ?–14)
WBC # BLD: 5.89 K/UL — SIGNIFICANT CHANGE UP (ref 3.8–10.5)
WBC # FLD AUTO: 5.89 K/UL — SIGNIFICANT CHANGE UP (ref 3.8–10.5)

## 2020-03-20 PROCEDURE — 99284 EMERGENCY DEPT VISIT MOD MDM: CPT

## 2020-03-20 PROCEDURE — 71045 X-RAY EXAM CHEST 1 VIEW: CPT | Mod: 26

## 2020-03-20 RX ORDER — PANTOPRAZOLE SODIUM 20 MG/1
40 TABLET, DELAYED RELEASE ORAL ONCE
Refills: 0 | Status: COMPLETED | OUTPATIENT
Start: 2020-03-20 | End: 2020-03-20

## 2020-03-20 RX ORDER — ACETAMINOPHEN 500 MG
650 TABLET ORAL ONCE
Refills: 0 | Status: COMPLETED | OUTPATIENT
Start: 2020-03-20 | End: 2020-03-20

## 2020-03-20 RX ORDER — LIDOCAINE 4 G/100G
1 CREAM TOPICAL ONCE
Refills: 0 | Status: COMPLETED | OUTPATIENT
Start: 2020-03-20 | End: 2020-03-20

## 2020-03-20 RX ADMIN — Medication 650 MILLIGRAM(S): at 10:12

## 2020-03-20 RX ADMIN — LIDOCAINE 1 PATCH: 4 CREAM TOPICAL at 10:13

## 2020-03-20 NOTE — ED ADULT TRIAGE NOTE - CHIEF COMPLAINT QUOTE
Pt arrives to ED via EMS from home c/o left arm and throat pain.  Arm pain began at 23:00 last night.  Pt reports throat is a "strange feeling" that has been present for 2 weeks. Pt denies difficulty breathing or swallowing.   Pt c/o cough since Saturday.  Pt has had contact with daughter who is asymptomatic but works with people who have been confirmed with covid.  Pt reports she has had intermittent fevers at home up to 100.0 F.

## 2020-03-20 NOTE — ED ADULT NURSE NOTE - OBJECTIVE STATEMENT
pt reports her daughter works in an office where there is a + COVID case. reports cough for 6 days non productive without dyspnea or hypoxia. t max of 100 at home with no severe or significant fevers. Patient denies and pain denies NVD. reports new onset lf chest pain radiating down her left arm described as a sharp abd numbness. denies pressure or dull pain. CCM in place NSR vitals stable currently afebrile. medicated as ordered and kept on airborne isolation. will CTM

## 2020-03-20 NOTE — ED PROVIDER NOTE - OBJECTIVE STATEMENT
54 y/o F w/ PMH IBS, mitral valve prolapse, and HLD presents to the ED c/o several days of fever and cough, but overnight had L sided shoulder to arm pain, described as burning pain which was severe overnight, and currently mild in nature. Pt also reports some neck pain. No N/V/D. Pt's daughter has several positive Covid-19 contacts at work, but her daughter is asymptomatic. Pt reports mild shortness of breath, but no orthopnea, chest pain or recent travel.

## 2020-03-20 NOTE — ED PROVIDER NOTE - PATIENT PORTAL LINK FT
You can access the FollowMyHealth Patient Portal offered by [facility] by registering at the following website: [email or web address]. By joining [facility]’s FollowMyHealth portal, you will also be able to view your health information using other applications (apps) compatible with our system.

## 2020-03-20 NOTE — ED PROVIDER NOTE - NSFOLLOWUPINSTRUCTIONS_ED_ALL_ED_FT
You may have the novel coronavirus. Please use precautions as outlined in the instruction packet provided to you.     Return to the ER for shortness of breath, uncontrolled fevers or pain, or other concerning signs.     Please follow up with your PCP

## 2020-03-20 NOTE — ED PROVIDER NOTE - PROGRESS NOTE DETAILS
Feeling much better, cough significantly improved. Pain reduced. Well appearing. Iso results discussed BETTIE Conde- asked by Dr. Tim to send prescription for hicodin given IT issue. I did not evaluate patient, no allergies, aware not to drive.

## 2020-03-20 NOTE — ED PROVIDER NOTE - CLINICAL SUMMARY MEDICAL DECISION MAKING FREE TEXT BOX
54 y/o F p/w symptoms consistent with viral syndrome. Suspect Covid-19, but will send RVP and flu swabs and provide symptomatic treatment. Will also send trop and obtain EKG, as L sided arm pain may be cardiac in origin, and reassess. 54 y/o F p/w symptoms consistent with viral syndrome. Suspect Covid-19, but will send RVP and flu swabs and provide symptomatic treatment. Will also send trop and obtain EKG, as L sided arm pain may be cardiac in origin, and reassess. Also may be MSK.

## 2020-03-25 NOTE — ED POST DISCHARGE NOTE - RESULT SUMMARY
BETTIE Baires: Pt called back stating [facility] does not carry hycodan. RX resent per initial prescribers orders.

## 2020-03-27 ENCOUNTER — TRANSCRIPTION ENCOUNTER (OUTPATIENT)
Age: 55
End: 2020-03-27

## 2020-05-19 PROBLEM — K58.9 IRRITABLE BOWEL SYNDROME, UNSPECIFIED: Chronic | Status: ACTIVE | Noted: 2020-03-20

## 2020-05-19 PROBLEM — K58.9 IRRITABLE BOWEL SYNDROME WITHOUT DIARRHEA: Chronic | Status: ACTIVE | Noted: 2020-03-20

## 2020-05-22 ENCOUNTER — APPOINTMENT (OUTPATIENT)
Dept: UROLOGY | Facility: CLINIC | Age: 55
End: 2020-05-22

## 2020-06-30 ENCOUNTER — APPOINTMENT (OUTPATIENT)
Dept: UROLOGY | Facility: CLINIC | Age: 55
End: 2020-06-30
Payer: COMMERCIAL

## 2020-06-30 ENCOUNTER — OUTPATIENT (OUTPATIENT)
Dept: OUTPATIENT SERVICES | Facility: HOSPITAL | Age: 55
LOS: 1 days | End: 2020-06-30
Payer: COMMERCIAL

## 2020-06-30 VITALS — TEMPERATURE: 97.9 F

## 2020-06-30 DIAGNOSIS — Z98.890 OTHER SPECIFIED POSTPROCEDURAL STATES: Chronic | ICD-10-CM

## 2020-06-30 DIAGNOSIS — R35.0 FREQUENCY OF MICTURITION: ICD-10-CM

## 2020-06-30 PROCEDURE — 99213 OFFICE O/P EST LOW 20 MIN: CPT | Mod: 25

## 2020-06-30 PROCEDURE — 76775 US EXAM ABDO BACK WALL LIM: CPT | Mod: 26

## 2020-06-30 PROCEDURE — 76775 US EXAM ABDO BACK WALL LIM: CPT

## 2020-07-01 NOTE — HISTORY OF PRESENT ILLNESS
[None] : None [FreeTextEntry1] : 55 yr old female presents to follow up with kidney stones. Pt denies dysuria, hematuria or abd/flank pain. \par \par They underwent left URS with laser and stone removal, left ureteral and renal calculi on 8/9/19. Possible MSK appearance.\par \par Stone analysis: 100% Calcium oxalate monohydrate \par previously mixed calcium oxalate\par Pt also notes difficulties with stress incontinence intermittent with cough and sneeze, but not overly troubled at this time.

## 2020-07-01 NOTE — ASSESSMENT
[FreeTextEntry1] : \par Renal US reviewed- bilateral stones, ~ 5 mm right lower, 8 mm left lower. Bilateral renal cysts with thin septa 6.8 cm on right, and 1.7 cm cyst with echoes on left (C/w prior hemorrhagic, hyperdense cyst on CT). Additional simple cyst left lower kidney. No doppler signal in cysts. No hydro, no solid renal mass.\par \par Plan \par 1) RTC with Renal US ~ 6 months f/u\par 2) cont diet mod, HCTZ for hypercalciuria

## 2020-07-01 NOTE — PHYSICAL EXAM
[Abdomen Soft] : soft [General Appearance - Well Developed] : well developed [Skin Color & Pigmentation] : normal skin color and pigmentation [] : no respiratory distress [Oriented To Time, Place, And Person] : oriented to person, place, and time [Heart Rate And Rhythm] : Heart rate and rhythm were normal [Normal Station and Gait] : the gait and station were normal for the patient's age [No Focal Deficits] : no focal deficits [FreeTextEntry1] : deferred

## 2020-07-14 DIAGNOSIS — N28.1 CYST OF KIDNEY, ACQUIRED: ICD-10-CM

## 2020-07-14 DIAGNOSIS — R82.994 HYPERCALCIURIA: ICD-10-CM

## 2020-07-14 DIAGNOSIS — N20.0 CALCULUS OF KIDNEY: ICD-10-CM

## 2020-09-11 ENCOUNTER — TRANSCRIPTION ENCOUNTER (OUTPATIENT)
Age: 55
End: 2020-09-11

## 2020-11-06 ENCOUNTER — RX RENEWAL (OUTPATIENT)
Age: 55
End: 2020-11-06

## 2021-01-11 ENCOUNTER — APPOINTMENT (OUTPATIENT)
Dept: CARDIOLOGY | Facility: CLINIC | Age: 56
End: 2021-01-11
Payer: COMMERCIAL

## 2021-01-12 ENCOUNTER — NON-APPOINTMENT (OUTPATIENT)
Age: 56
End: 2021-01-12

## 2021-01-12 ENCOUNTER — APPOINTMENT (OUTPATIENT)
Dept: CARDIOLOGY | Facility: CLINIC | Age: 56
End: 2021-01-12
Payer: COMMERCIAL

## 2021-01-12 VITALS
HEIGHT: 67 IN | RESPIRATION RATE: 16 BRPM | BODY MASS INDEX: 27.94 KG/M2 | DIASTOLIC BLOOD PRESSURE: 86 MMHG | SYSTOLIC BLOOD PRESSURE: 112 MMHG | WEIGHT: 178 LBS | HEART RATE: 63 BPM

## 2021-01-12 PROCEDURE — 93000 ELECTROCARDIOGRAM COMPLETE: CPT

## 2021-01-12 PROCEDURE — 99072 ADDL SUPL MATRL&STAF TM PHE: CPT

## 2021-01-12 PROCEDURE — 99203 OFFICE O/P NEW LOW 30 MIN: CPT

## 2021-01-13 RX ORDER — KETOROLAC TROMETHAMINE 30 MG/ML
30 INJECTION, SOLUTION INTRAMUSCULAR; INTRAVENOUS
Qty: 1 | Refills: 0 | Status: DISCONTINUED | OUTPATIENT
Start: 2019-08-14 | End: 2021-01-13

## 2021-01-13 RX ORDER — PANTOPRAZOLE 40 MG/1
TABLET, DELAYED RELEASE ORAL
Refills: 0 | Status: DISCONTINUED | COMMUNITY
End: 2021-01-13

## 2021-01-13 RX ORDER — CHLORTHALIDONE 25 MG/1
25 TABLET ORAL DAILY
Qty: 30 | Refills: 2 | Status: DISCONTINUED | COMMUNITY
Start: 2020-03-16 | End: 2021-01-13

## 2021-01-13 RX ORDER — TAMSULOSIN HYDROCHLORIDE 0.4 MG/1
0.4 CAPSULE ORAL
Qty: 30 | Refills: 3 | Status: DISCONTINUED | COMMUNITY
Start: 2019-08-07 | End: 2021-01-13

## 2021-01-19 ENCOUNTER — NON-APPOINTMENT (OUTPATIENT)
Age: 56
End: 2021-01-19

## 2021-01-22 ENCOUNTER — NON-APPOINTMENT (OUTPATIENT)
Age: 56
End: 2021-01-22

## 2021-02-02 ENCOUNTER — APPOINTMENT (OUTPATIENT)
Dept: CARDIOLOGY | Facility: CLINIC | Age: 56
End: 2021-02-02
Payer: COMMERCIAL

## 2021-02-02 PROCEDURE — 93015 CV STRESS TEST SUPVJ I&R: CPT

## 2021-02-02 PROCEDURE — 99213 OFFICE O/P EST LOW 20 MIN: CPT | Mod: 25

## 2021-02-02 PROCEDURE — 99072 ADDL SUPL MATRL&STAF TM PHE: CPT

## 2021-02-02 NOTE — REASON FOR VISIT
[FreeTextEntry1] : Pt is a 56 y/o F with a PMH of MVP, Baretts Esophagus, Nephrolithiasis, Psoriasis presenting for a lipid/cardiac consultation. Pt reports for the last month she has been having a "buzzing sensation" in her body when she sleeps for the past month. Pt notes she awakes from this sensation and she feels like her heart is racing. Pt also reports she had blood work collected on 2020 from her PMD which showed a total cholesterol of 276, HDL 69, LDL-C 190, and triglycerides 86. Pt reports she saw a cardiologist a year and half ago but was concerned about her heart and health and wanted to be re-evaluted after googling Dr. Snowden and seeing good reviews. \par Pt has a significant family hx of heart disease with her father having a MI in his 40s along with an unknown arrythmia. Pt notes her mother and sister also have elevated cholesterol levels. Pt's maternal aunts have DM. \par Pt reports being under a lot of stress recently with the death of her ex- last year. \par Pt has a surgical hx of \par Pt denies use of excessive alcohol or smoking \par Pt notes her diet is well adjusted and is hesitant about starting statin therapy due to side effects she read online. \par Pt will have blood re-drawn today to evaluate her lipid panel.

## 2021-02-02 NOTE — DISCUSSION/SUMMARY
[FreeTextEntry1] : This is a 55-year-old female with past medical history significant mitral valve prolapse, Landin's esophagus, nephrolithiasis, psoriasis, who comes in for cardiac follow-up evaluation.\par She denies chest pain, shortness of breath, dizziness or syncope.\par The patient does get occasional dyspnea on exertion.\par The patient had normal exercise stress test February 2, 2021.\par She will have an echo Doppler examination February 2, 2021 to rule out mitral valve prolapse.\par Patient had blood work done January 4, 2021 which demonstrated a total cholesterol 276 mg/dL, HDL 69 mg/dL, calculated LDL cholesterol 190 mg/dL.\par These findings are consistent with heterozygous familial hypercholesterolemia.\par She is also concerned about her children, given her 's sudden death felt to be related to myocardial infarction.  She has a 28-year-old daughter and 20-year-old son.\par The patient will have new blood work today for lipid panel including direct LDL cholesterol.  She will then start Crestor 20 mg daily.  She will follow-up with me in 6 to 8 weeks to repeat her blood work on Crestor therapy.\par Lifestyle modification was also encouraged.\par She has been complaining of a buzzing sensation in her chest since December 2020.  She denies a specific rapid heartbeat palpitation, or skipped heartbeat.\par She does not drink excessive caffeine or alcohol.  She has no history of rheumatic fever.  She has been under a lot of stress recently related to the loss of her .  Her cardiac risk factors include hypercholesterolemia.\par Electrocardiogram done January 12, 2021 demonstrated normal sinus rhythm rate 64 bpm and is otherwise unremarkable.\par Blood work-January 4, 2021 demonstrated a calculated LDL cholesterol 190 mg/dL, total cholesterol 276 mg/dL, HDL 69 mg/dL, triglycerides 86 mg/dL and are consistent with familial heterozygous hypercholesterolemia.  \par I have also asked her to make an appointment with our registered dietitian.  \par It is unclear what is causing this buzzing sensation in her chest at nighttime.  This could represent dyspepsia.  \par \par The patient understands that aerobic exercises must be increased to 40 minutes 4 times per week. A detailed discussion of lifestyle modification was done today. The patient has a good understanding of the diagnosis, and treatment plan. Lifestyle modification was also outlined.\par  \par

## 2021-02-02 NOTE — PHYSICAL EXAM
[General Appearance - Well Developed] : well developed [Normal Appearance] : normal appearance [General Appearance - Well Nourished] : well nourished [Normal Conjunctiva] : the conjunctiva exhibited no abnormalities [Normal Oral Mucosa] : normal oral mucosa [No Oral Pallor] : no oral pallor [Normal Oropharynx] : normal oropharynx [Normal Jugular Venous V Waves Present] : normal jugular venous V waves present [Respiration, Rhythm And Depth] : normal respiratory rhythm and effort [Exaggerated Use Of Accessory Muscles For Inspiration] : no accessory muscle use [Auscultation Breath Sounds / Voice Sounds] : lungs were clear to auscultation bilaterally [Bowel Sounds] : normal bowel sounds [Abdomen Soft] : soft [Abnormal Walk] : normal gait [Nail Clubbing] : no clubbing of the fingernails [Cyanosis, Localized] : no localized cyanosis [Skin Turgor] : normal skin turgor [] : no rash [Oriented To Time, Place, And Person] : oriented to person, place, and time [Impaired Insight] : insight and judgment were intact [Affect] : the affect was normal [5th Left ICS - MCL] : palpated at the 5th LICS in the midclavicular line [Normal] : normal [No Precordial Heave] : no precordial heave was noted [Normal Rate] : normal [Rhythm Regular] : regular [Normal S1] : normal S1 [Normal S2] : normal S2 [No Gallop] : no gallop heard [S3] : no S3 [S4] : no S4 [Click] : no click [Pericardial Rub] : no pericardial rub [I] : a grade 1 [2+] : left 2+ [No Abnormalities] : the abdominal aorta was not enlarged and no bruit was heard [No Pitting Edema] : no pitting edema present

## 2021-02-08 ENCOUNTER — NON-APPOINTMENT (OUTPATIENT)
Age: 56
End: 2021-02-08

## 2021-03-05 ENCOUNTER — NON-APPOINTMENT (OUTPATIENT)
Age: 56
End: 2021-03-05

## 2021-03-11 ENCOUNTER — TRANSCRIPTION ENCOUNTER (OUTPATIENT)
Age: 56
End: 2021-03-11

## 2021-03-12 ENCOUNTER — NON-APPOINTMENT (OUTPATIENT)
Age: 56
End: 2021-03-12

## 2021-03-31 ENCOUNTER — APPOINTMENT (OUTPATIENT)
Dept: UROLOGY | Facility: CLINIC | Age: 56
End: 2021-03-31
Payer: COMMERCIAL

## 2021-03-31 ENCOUNTER — NON-APPOINTMENT (OUTPATIENT)
Age: 56
End: 2021-03-31

## 2021-03-31 ENCOUNTER — TRANSCRIPTION ENCOUNTER (OUTPATIENT)
Age: 56
End: 2021-03-31

## 2021-03-31 ENCOUNTER — OUTPATIENT (OUTPATIENT)
Dept: OUTPATIENT SERVICES | Facility: HOSPITAL | Age: 56
LOS: 1 days | End: 2021-03-31
Payer: COMMERCIAL

## 2021-03-31 DIAGNOSIS — Z98.890 OTHER SPECIFIED POSTPROCEDURAL STATES: Chronic | ICD-10-CM

## 2021-03-31 DIAGNOSIS — R35.0 FREQUENCY OF MICTURITION: ICD-10-CM

## 2021-03-31 PROCEDURE — 99072 ADDL SUPL MATRL&STAF TM PHE: CPT

## 2021-03-31 PROCEDURE — 99214 OFFICE O/P EST MOD 30 MIN: CPT | Mod: 25

## 2021-03-31 PROCEDURE — 76775 US EXAM ABDO BACK WALL LIM: CPT

## 2021-03-31 PROCEDURE — 76775 US EXAM ABDO BACK WALL LIM: CPT | Mod: 26

## 2021-04-01 NOTE — HISTORY OF PRESENT ILLNESS
[Dysuria] : dysuria [Abdominal Pain] : abdominal pain [None] : None [FreeTextEntry1] : 56 yr old female presents to follow up with kidney stones. Hx- of hypercalciuria, possible MSK. Taking HCTZ 25, at times taking half the dose. Pt complaining of dysuria, and abdominal left upper quadrant pain. \par \par They underwent left URS with laser and stone removal, left ureteral and renal calculi on 8/9/19. Possible MSK appearance.\par \par Stone analysis: 100% Calcium oxalate monohydrate \par previously mixed calcium oxalate\par \par Doing well at this time.

## 2021-04-01 NOTE — PHYSICAL EXAM
[General Appearance - Well Developed] : well developed [Abdomen Soft] : soft [Skin Color & Pigmentation] : normal skin color and pigmentation [Edema] : no peripheral edema [] : no respiratory distress [Oriented To Time, Place, And Person] : oriented to person, place, and time [Normal Station and Gait] : the gait and station were normal for the patient's age [No Focal Deficits] : no focal deficits [FreeTextEntry1] : musculoskeletal pain

## 2021-04-01 NOTE — ASSESSMENT
[FreeTextEntry1] : Renal US reviewed today- left kidney 13.0 mm stone, increased from 8.3 mm. Right kidney 5 mm stone.  There is a 6.5 x 5.0 x 5.0cm septated cyst in the lower pole of the right kidney, stable in size and nonvascular. There are 2 small cysts with calcifications lower pole of the left kidney.\par \par I had long discussion with patient about their stones, and about options, risks, and benefits of all treatments. Main options for definitive stone treatment include ESWL, URS, PCNL. \par \par ESWL success best with smaller, less dense stones, and with short skin to stone distance and favorable location of the stone within the urinary tract, while URS is more successful treatment with multiple stones, more dense stones, or challenging body habitus or stone location. PCNL is best option for larger, more dense and complex stones, and particularly those involving the lower pole. Non-definitive stone treatment options for drainage, using either stents or nephrostomy, also reviewed: these are of lower immediate surgical risks, but incur multiple procedures to manage and may have their own complications and effects on quality of life. Still, nephrostomy or nephroureteral catheter can allow maintenance of urinary system drainage without surgical risks, and management in office with exchanges (avoiding the anesthesia and testing which would be present with bilateral internal stent exchange).\par \par Risks of nontreatment with obstruction can lead to very high rate of renal function loss in stone-bearing kidney over the next months to years.\par \par In this patient's case, I recommend left URS if treatment desired at this time, though may require 2 stage\par \par Risks/benefits/success/recovery expectations all reviewed at length, particularly with respect to patient's comorbidities, and inclusive of infection/sepsis, bleeding, need for secondary procedures or secondary stages such as embolization or open surgery, and even risks of death due to acute issues superimposed on comorbidities.\par \par Pt prefers to undergo: left URS\par \par Will schedule.\par \par Plan\par 1) Repeat 24 hr urine to reassess metabolic;\par will strongly consider pot citrate as well\par

## 2021-04-02 ENCOUNTER — LABORATORY RESULT (OUTPATIENT)
Age: 56
End: 2021-04-02

## 2021-04-02 ENCOUNTER — APPOINTMENT (OUTPATIENT)
Dept: CARDIOLOGY | Facility: CLINIC | Age: 56
End: 2021-04-02
Payer: COMMERCIAL

## 2021-04-02 ENCOUNTER — NON-APPOINTMENT (OUTPATIENT)
Age: 56
End: 2021-04-02

## 2021-04-02 VITALS
HEIGHT: 67 IN | DIASTOLIC BLOOD PRESSURE: 80 MMHG | RESPIRATION RATE: 16 BRPM | OXYGEN SATURATION: 98 % | BODY MASS INDEX: 27.78 KG/M2 | SYSTOLIC BLOOD PRESSURE: 118 MMHG | TEMPERATURE: 98 F | WEIGHT: 177 LBS | HEART RATE: 62 BPM

## 2021-04-02 DIAGNOSIS — N20.0 CALCULUS OF KIDNEY: ICD-10-CM

## 2021-04-02 DIAGNOSIS — K21.9 GASTRO-ESOPHAGEAL REFLUX DISEASE W/OUT ESOPHAGITIS: ICD-10-CM

## 2021-04-02 DIAGNOSIS — R82.994 HYPERCALCIURIA: ICD-10-CM

## 2021-04-02 DIAGNOSIS — N28.1 CYST OF KIDNEY, ACQUIRED: ICD-10-CM

## 2021-04-02 LAB — BACTERIA UR CULT: NORMAL

## 2021-04-02 PROCEDURE — 99214 OFFICE O/P EST MOD 30 MIN: CPT

## 2021-04-02 PROCEDURE — 93000 ELECTROCARDIOGRAM COMPLETE: CPT

## 2021-04-02 PROCEDURE — 99072 ADDL SUPL MATRL&STAF TM PHE: CPT

## 2021-04-02 RX ORDER — ROSUVASTATIN CALCIUM 20 MG/1
20 TABLET, FILM COATED ORAL
Qty: 90 | Refills: 1 | Status: DISCONTINUED | COMMUNITY
Start: 2021-01-22 | End: 2021-04-02

## 2021-04-02 RX ORDER — FAMOTIDINE 40 MG/1
40 TABLET, FILM COATED ORAL TWICE DAILY
Refills: 0 | Status: DISCONTINUED | COMMUNITY
Start: 2020-12-22 | End: 2021-04-02

## 2021-04-02 NOTE — DISCUSSION/SUMMARY
[FreeTextEntry1] : Dr. Snowden-(PRIOR VISIT and PMH WITH Dr. Snowden): \par This is a 55-year-old female with past medical history significant mitral valve prolapse, Landin's esophagus, nephrolithiasis, psoriasis, who comes in for cardiac follow-up evaluation.\par She denies chest pain, shortness of breath, dizziness or syncope.\par The patient does get occasional dyspnea on exertion.\par \par The patient had normal exercise stress test February 2, 2021.\par She will have an echo Doppler examination February 2, 2021 to rule out mitral valve prolapse.\par Patient had blood work done January 4, 2021 which demonstrated a total cholesterol 276 mg/dL, HDL 69 mg/dL, calculated LDL cholesterol 190 mg/dL.\par These findings are consistent with heterozygous familial hypercholesterolemia.\par She is also concerned about her children, given her 's sudden death felt to be related to myocardial infarction.  She has a 28-year-old daughter and 20-year-old son.\par \par The patient will have new blood work today for lipid panel including direct LDL cholesterol.  She will then start Crestor 20 mg daily.  She will follow-up with me in 6 to 8 weeks to repeat her blood work on Crestor therapy.\par \par Lifestyle modification was also encouraged.\par She has been complaining of a buzzing sensation in her chest since December 2020.  She denies a specific rapid heartbeat palpitation, or skipped heartbeat.\par She does not drink excessive caffeine or alcohol.  She has no history of rheumatic fever.  She has been under a lot of stress recently related to the loss of her .  Her cardiac risk factors include hypercholesterolemia.\par \par Electrocardiogram done January 12, 2021 demonstrated normal sinus rhythm rate 64 bpm and is otherwise unremarkable.\par \par Blood work-January 4, 2021 demonstrated a calculated LDL cholesterol 190 mg/dL, total cholesterol 276 mg/dL, HDL 69 mg/dL, triglycerides 86 mg/dL and are consistent with familial heterozygous hypercholesterolemia.  \par \par I have also asked her to make an appointment with our registered dietitian.  \par It is unclear what is causing this buzzing sensation in her chest at nighttime.  This could represent dyspepsia.  \par \par The patient understands that aerobic exercises must be increased to 40 minutes 4 times per week. A detailed discussion of lifestyle modification was done today. The patient has a good understanding of the diagnosis, and treatment plan. Lifestyle modification was also outlined.\par  \par

## 2021-04-02 NOTE — ASSESSMENT
[FreeTextEntry1] : This is a 56 year year old female here today for follow up cardiac evaluation. \par She has a past medical history significant for  mitral valve prolapse, Landin's esophagus, nephrolithiasis, psoriasis\par \par She called earlier this week stating that she did not feel well. Phone note from March 31, 2021:\par \par Ms. JOSSUE PAGAN was called and spoken to on the phone about how she is feeling and she states that she is waking up in the middle of the night with trouble breathing like she has to take a deep breath and walk around to catch her breath. She does have a pulmonologist Dr. Holt in [place] who she will also be following up with for management of her sleep apnea. I explained that she should go to the ER if she is not feeling well but at this time she does not wish to do so. We will move her appointment up sooner to this Friday and she will contact her pulmonologist in the meantime to asses if there is any need for PFT or CPAP adjustments. \par \par Today she is still complaining of having difficulty breathing in the middle of the night and states that she was recently told that she had an infection in her urine. She is also complaining of heart palpitations/buzzing feeling in her chest at times. These issues have been going on and off for approx. the last year. She has not been on Rosuvastatin due to having muscle aches and believes that she can not tolerate the statins. Based off her previous blood work she does appear to have FH. She states that she also feels very fatigued when she wakes up in the morning/does not feel refreshed and that her legs fell heavy. \par \par -She does not drink excessive caffeine or alcohol.  She has no history of rheumatic fever.  She has been under a lot of stress recently related to the loss of her .  Her cardiac risk factors include hypercholesterolemia.\par \par BLOOD PRESSURE:\par -BP is well controlled in today's visit. She is on HCTZ 25mg PO DAILY but for her renal stones not for her BP as per the patient. \par \par BLOOD WORK:\par -Blood work was done 2/2/2021 which demonstrated Cholesterol of 261, LDL of 173 LPa 103.0. She was prescribed Rosuvastatin 20mg PO DAILY which I recommended she remain on but she is not able to tolerate this medication. \par -New blood work done today in the office. She may be a candidate for Nexlizet 180/10mg PO Daily.\par \par CHOLESTEROL CONTROL:\par -Patient will continue the advised  TLC diet and to continue follow-up for treatment of hyperlipidemia and repeat blood testing with diet and exercise. I have discussed different exercises and the importance of maintenance of optimal body weight. The importance of staying within guidelines and recommendations was stressed to the patient today and they acknowledged that they understand this to me verbally.\par \par  -Ms. PAGAN was educated and advised that failure to follow-up with my medical recommendations to lower cholesterol can result in severe health consequences therefore, they will continuing a low saturated and low fat diet and to avoid excessive carbohydrates to help reduce triglycerides and that lowering LDL levels is associated with a significant decrease in serious cardiac events including but not limited to heart attack stroke and overall death. We will continue lipid lowering agents as advised based on blood test results and the patient understands to call if they develop severe muscle discomfort or if they have a reddish tinted discoloration in their urine.\par \par \par TESTING/REPORTS:\par -EKG done Apr 02, 2021 which demonstrated regular sinus rhythm with nonspecific ST-T wave changes, BPM of 62.\par -The patient had an echocardiogram done on 2/2/2021 demonstrated mild mitral, tricuspid and pulmonic regurgitation with normal left ventricular systolic function. Thickened mitral valve.\par -EF on echo reported to be 68%.\par -The patient had a normal exercise stress test done on 2/2/2021 but is still complaining of VELARDE and occasional chest pains which she does not know if it can be related to her Landin's esophagus. \par \par PLAN:\par -She will follow up with her pulmonologist to evaluate her sleep apnea.\par -She will follow up with her renal doctor.\par -She will follow up with her GI doctor and is scheduled to have an endoscopy next week. \par -She will follow up with our RD Jailene Sarkar.\par -The patient will schedule an Nuclear Stress Test rule out significant coronary artery disease.\par -We will order 24 hour holter monitor to evaluate heart palpitations.\par \par I have discussed the plan of care with Ms. JOSSUE PAGAN  and she  will follow up in 1 month. She is compliant with all of her medications.\par \par The patient understands that aerobic exercises must be increased to minutes 4 times/week and a detailed discussion of lifestyle modification was done today. \par The patient has a good understanding of the diagnosis, treatment plan and lifestyle modification. \par She will contact me at the office for any questions with their care or any changes in their health status.\par \par The plan of care was discussed with supervising physician, Dr. Snowden while present in the office at the time of the visit. \par \par Kelsey SOLORIO

## 2021-04-02 NOTE — PHYSICAL EXAM
[General Appearance - Well Developed] : well developed [Normal Appearance] : normal appearance [General Appearance - Well Nourished] : well nourished [Normal Conjunctiva] : the conjunctiva exhibited no abnormalities [No Oral Pallor] : no oral pallor [Normal Oral Mucosa] : normal oral mucosa [Normal Oropharynx] : normal oropharynx [Normal Jugular Venous V Waves Present] : normal jugular venous V waves present [Respiration, Rhythm And Depth] : normal respiratory rhythm and effort [Exaggerated Use Of Accessory Muscles For Inspiration] : no accessory muscle use [Auscultation Breath Sounds / Voice Sounds] : lungs were clear to auscultation bilaterally [Bowel Sounds] : normal bowel sounds [Abdomen Soft] : soft [Abnormal Walk] : normal gait [Nail Clubbing] : no clubbing of the fingernails [Cyanosis, Localized] : no localized cyanosis [Skin Turgor] : normal skin turgor [] : no rash [Oriented To Time, Place, And Person] : oriented to person, place, and time [Impaired Insight] : insight and judgment were intact [Affect] : the affect was normal [5th Left ICS - MCL] : palpated at the 5th LICS in the midclavicular line [Normal] : normal [No Precordial Heave] : no precordial heave was noted [Normal Rate] : normal [Rhythm Regular] : regular [Normal S1] : normal S1 [Normal S2] : normal S2 [No Gallop] : no gallop heard [I] : a grade 1 [2+] : left 2+ [No Abnormalities] : the abdominal aorta was not enlarged and no bruit was heard [No Pitting Edema] : no pitting edema present [S3] : no S3 [S4] : no S4 [Click] : no click [Pericardial Rub] : no pericardial rub

## 2021-04-02 NOTE — REVIEW OF SYSTEMS
[Negative] : Heme/Lymph [Feeling Fatigued] : feeling fatigued [Dyspnea on exertion] : dyspnea during exertion [Chest  Pressure] : chest pressure [Palpitations] : palpitations [Abdominal Pain] : abdominal pain [Heartburn] : heartburn [Muscle Cramps] : muscle cramps [see HPI] : see HPI [Under Stress] : under stress

## 2021-04-05 ENCOUNTER — TRANSCRIPTION ENCOUNTER (OUTPATIENT)
Age: 56
End: 2021-04-05

## 2021-04-08 ENCOUNTER — APPOINTMENT (OUTPATIENT)
Dept: CARDIOLOGY | Facility: CLINIC | Age: 56
End: 2021-04-08
Payer: COMMERCIAL

## 2021-04-08 PROCEDURE — 97802 MEDICAL NUTRITION INDIV IN: CPT | Mod: 95

## 2021-04-15 ENCOUNTER — APPOINTMENT (OUTPATIENT)
Dept: CARDIOLOGY | Facility: CLINIC | Age: 56
End: 2021-04-15

## 2021-05-03 ENCOUNTER — NON-APPOINTMENT (OUTPATIENT)
Age: 56
End: 2021-05-03

## 2021-05-11 ENCOUNTER — APPOINTMENT (OUTPATIENT)
Dept: CARDIOLOGY | Facility: CLINIC | Age: 56
End: 2021-05-11
Payer: MEDICARE

## 2021-05-11 VITALS
HEART RATE: 98 BPM | SYSTOLIC BLOOD PRESSURE: 124 MMHG | DIASTOLIC BLOOD PRESSURE: 80 MMHG | RESPIRATION RATE: 16 BRPM | HEIGHT: 67 IN | OXYGEN SATURATION: 98 % | BODY MASS INDEX: 27.47 KG/M2 | WEIGHT: 175 LBS | TEMPERATURE: 98 F

## 2021-05-11 PROCEDURE — 99214 OFFICE O/P EST MOD 30 MIN: CPT

## 2021-05-11 PROCEDURE — 99072 ADDL SUPL MATRL&STAF TM PHE: CPT

## 2021-05-11 PROCEDURE — 93224 XTRNL ECG REC UP TO 48 HRS: CPT

## 2021-05-11 NOTE — PHYSICAL EXAM
[Well Developed] : well developed [Well Nourished] : well nourished [No Acute Distress] : no acute distress [Normal Venous Pressure] : normal venous pressure [No Carotid Bruit] : no carotid bruit [Normal S1, S2] : normal S1, S2 [No Rub] : no rub [Murmur] : murmur [Clear Lung Fields] : clear lung fields [Good Air Entry] : good air entry [No Respiratory Distress] : no respiratory distress  [Soft] : abdomen soft [Non Tender] : non-tender [No Masses/organomegaly] : no masses/organomegaly [Normal Bowel Sounds] : normal bowel sounds [Normal Gait] : normal gait [No Edema] : no edema [No Cyanosis] : no cyanosis [No Clubbing] : no clubbing [No Varicosities] : no varicosities [No Rash] : no rash [No Skin Lesions] : no skin lesions [Moves all extremities] : moves all extremities [No Focal Deficits] : no focal deficits [Normal Speech] : normal speech [Alert and Oriented] : alert and oriented [Normal memory] : normal memory [General Appearance - Well Developed] : well developed [Normal Appearance] : normal appearance [General Appearance - Well Nourished] : well nourished [Normal Conjunctiva] : the conjunctiva exhibited no abnormalities [Normal Oral Mucosa] : normal oral mucosa [No Oral Pallor] : no oral pallor [Normal Oropharynx] : normal oropharynx [Normal Jugular Venous V Waves Present] : normal jugular venous V waves present [Respiration, Rhythm And Depth] : normal respiratory rhythm and effort [Exaggerated Use Of Accessory Muscles For Inspiration] : no accessory muscle use [Auscultation Breath Sounds / Voice Sounds] : lungs were clear to auscultation bilaterally [Bowel Sounds] : normal bowel sounds [Abdomen Soft] : soft [Abnormal Walk] : normal gait [Nail Clubbing] : no clubbing of the fingernails [Cyanosis, Localized] : no localized cyanosis [Skin Turgor] : normal skin turgor [] : no rash [Oriented To Time, Place, And Person] : oriented to person, place, and time [Impaired Insight] : insight and judgment were intact [Affect] : the affect was normal [5th Left ICS - MCL] : palpated at the 5th LICS in the midclavicular line [Normal] : normal [No Precordial Heave] : no precordial heave was noted [Normal Rate] : normal [Rhythm Regular] : regular [Normal S1] : normal S1 [Normal S2] : normal S2 [No Gallop] : no gallop heard [I] : a grade 1 [2+] : left 2+ [No Abnormalities] : the abdominal aorta was not enlarged and no bruit was heard [No Pitting Edema] : no pitting edema present [Apical Thrill] : no thrill palpable at the apex [Distant] : the heart sounds were ~L not distant [Right Carotid Bruit] : no bruit heard over the right carotid [Left Carotid Bruit] : no bruit heard over the left carotid [Right Femoral Bruit] : no bruit heard over the right femoral artery [Left Femoral Bruit] : no bruit heard over the left femoral artery [S3] : no S3 [S4] : no S4 [Click] : no click [Pericardial Rub] : no pericardial rub

## 2021-05-11 NOTE — REVIEW OF SYSTEMS
[Negative] : Heme/Lymph [Palpitations] : palpitations [SOB] : no shortness of breath [Dyspnea on exertion] : not dyspnea during exertion [Chest Discomfort] : no chest discomfort [Lower Ext Edema] : no extremity edema [Leg Claudication] : no intermittent leg claudication [Orthopnea] : no orthopnea [PND] : no PND [Syncope] : no syncope

## 2021-05-11 NOTE — REASON FOR VISIT
[CV Risk Factors and Non-Cardiac Disease] : CV risk factors and non-cardiac disease [Structural Heart and Valve Disease] : structural heart and valve disease [FreeTextEntry1] : This is a 56-year-old female with past medical history significant mitral valve prolapse, Landin's esophagus, nephrolithiasis, psoriasis, who comes in for cardiac follow-up evaluation.\par She has been complaining of palpitations for the past few months. She feels her heart racing at night and she often wakes up with a buzzing sensation in her body. \par She denies chest pain, shortness of breath, dizziness or syncope.\par

## 2021-05-11 NOTE — DISCUSSION/SUMMARY
[FreeTextEntry1] : This is a 56-year-old female with past medical history significant mitral valve prolapse, Landin's esophagus, nephrolithiasis, psoriasis, who comes in for cardiac follow-up evaluation.\par She denies chest pain, shortness of breath, dizziness or syncope.\par The patient is complaining of a sensation of tremors underneath her skin involving her arms and legs and chest.  There is no movement dyskinesis or disorder, or subjective findings.\par The patient will have a Holter monitor done to rule out significant arrhythmia or heart block and to evaluate her previous palpitations.\par The patient is also concerned about early coronary artery disease, given her 's history of an acute cardiac event and she will schedule a coronary artery calcium score.\par The patient discontinued Crestor 20 mg daily on her own March 3, 2021 due to feelings of fatigue and backaches.  She stopped the medication and "felt well".\par The patient will continue to work on her diet and exercise program, however if her LDL is consistent with heterozygous familial hypercholesterolemia, statin therapy to reduce LDL to a target of less than 100 mg/dL is appropriate.\par The patient is currently getting epidural injections.  I have asked her to follow-up with her neurologist regarding these subcutaneous feelings.\par Echo Doppler examination done February 2, 2021 demonstrated minimal to mild mitral and tricuspid valve regurgitation, physiologic pulmonic valve regurgitation, normal left ventricular function, and estimated ejection fraction 68%.\par The patient had elevated LDL cholesterol, consistent with heterozygous hypercholesterolemia, and was started on Crestor therapy, and the patient is currently taking no lipid-lowering therapy.\par The patient had a lipid panel done April 12, 2021 which demonstrated total cholesterol 236, calculated  mg/dL, non-HDL cholesterol 180 mg/dL and HDL of 56 mg/dL, and direct LDL cholesterol 161 mg/dL.\par She is currently hemodynamically stable and asymptomatic from a cardiac standpoint.\par The patient had normal exercise stress test February 2, 2021.\par She will have an echo Doppler examination February 2, 2021 to rule out mitral valve prolapse.\par Patient had blood work done January 4, 2021 which demonstrated a total cholesterol 276 mg/dL, HDL 69 mg/dL, calculated LDL cholesterol 190 mg/dL.\par These findings are consistent with heterozygous familial hypercholesterolemia.\par She is also concerned about her children, given her 's sudden death felt to be related to myocardial infarction.  \par She has been complaining of a buzzing sensation in her chest since December 2020.  She denies a specific rapid heartbeat palpitation, or skipped heartbeat.\par She does not drink excessive caffeine or alcohol.  She has no history of rheumatic fever.  She has been under a lot of stress recently related to the loss of her .  Her cardiac risk factors include hypercholesterolemia.\par Electrocardiogram done January 12, 2021 demonstrated normal sinus rhythm rate 64 bpm and is otherwise unremarkable.\par Blood work-January 4, 2021 demonstrated a calculated LDL cholesterol 190 mg/dL, total cholesterol 276 mg/dL, HDL 69 mg/dL, triglycerides 86 mg/dL and are consistent with familial heterozygous hypercholesterolemia.  \par I have also asked her to make an appointment with our registered dietitian.  \par The patient understands that aerobic exercises must be increased to 40 minutes 4 times per week. A detailed discussion of lifestyle modification was done today. The patient has a good understanding of the diagnosis, and treatment plan. Lifestyle modification was also outlined.\par  \par

## 2021-05-12 RX ORDER — HYDROCHLOROTHIAZIDE 25 MG/1
25 TABLET ORAL
Qty: 90 | Refills: 3 | Status: COMPLETED | COMMUNITY
Start: 2019-10-16 | End: 2021-05-12

## 2021-05-12 RX ORDER — DEXLANSOPRAZOLE 60 MG/1
60 CAPSULE, DELAYED RELEASE ORAL
Refills: 0 | Status: COMPLETED | COMMUNITY
Start: 2021-04-02 | End: 2021-05-12

## 2021-05-17 ENCOUNTER — NON-APPOINTMENT (OUTPATIENT)
Age: 56
End: 2021-05-17

## 2021-06-09 ENCOUNTER — OUTPATIENT (OUTPATIENT)
Dept: OUTPATIENT SERVICES | Facility: HOSPITAL | Age: 56
LOS: 1 days | End: 2021-06-09
Payer: MEDICARE

## 2021-06-09 VITALS
SYSTOLIC BLOOD PRESSURE: 104 MMHG | WEIGHT: 173.94 LBS | HEIGHT: 68 IN | RESPIRATION RATE: 18 BRPM | OXYGEN SATURATION: 97 % | TEMPERATURE: 98 F | DIASTOLIC BLOOD PRESSURE: 84 MMHG | HEART RATE: 98 BPM

## 2021-06-09 DIAGNOSIS — Z98.890 OTHER SPECIFIED POSTPROCEDURAL STATES: Chronic | ICD-10-CM

## 2021-06-09 DIAGNOSIS — N20.0 CALCULUS OF KIDNEY: ICD-10-CM

## 2021-06-09 DIAGNOSIS — Z96.0 PRESENCE OF UROGENITAL IMPLANTS: Chronic | ICD-10-CM

## 2021-06-09 DIAGNOSIS — G47.33 OBSTRUCTIVE SLEEP APNEA (ADULT) (PEDIATRIC): ICD-10-CM

## 2021-06-09 LAB
ANION GAP SERPL CALC-SCNC: 15 MMOL/L — SIGNIFICANT CHANGE UP (ref 5–17)
BUN SERPL-MCNC: 15 MG/DL — SIGNIFICANT CHANGE UP (ref 7–23)
CALCIUM SERPL-MCNC: 10.5 MG/DL — SIGNIFICANT CHANGE UP (ref 8.4–10.5)
CHLORIDE SERPL-SCNC: 101 MMOL/L — SIGNIFICANT CHANGE UP (ref 96–108)
CO2 SERPL-SCNC: 24 MMOL/L — SIGNIFICANT CHANGE UP (ref 22–31)
CREAT SERPL-MCNC: 0.75 MG/DL — SIGNIFICANT CHANGE UP (ref 0.5–1.3)
GLUCOSE SERPL-MCNC: 100 MG/DL — HIGH (ref 70–99)
HCT VFR BLD CALC: 43.1 % — SIGNIFICANT CHANGE UP (ref 34.5–45)
HGB BLD-MCNC: 14.3 G/DL — SIGNIFICANT CHANGE UP (ref 11.5–15.5)
MCHC RBC-ENTMCNC: 30.4 PG — SIGNIFICANT CHANGE UP (ref 27–34)
MCHC RBC-ENTMCNC: 33.2 GM/DL — SIGNIFICANT CHANGE UP (ref 32–36)
MCV RBC AUTO: 91.5 FL — SIGNIFICANT CHANGE UP (ref 80–100)
NRBC # BLD: 0 /100 WBCS — SIGNIFICANT CHANGE UP (ref 0–0)
PLATELET # BLD AUTO: 252 K/UL — SIGNIFICANT CHANGE UP (ref 150–400)
POTASSIUM SERPL-MCNC: 4 MMOL/L — SIGNIFICANT CHANGE UP (ref 3.5–5.3)
POTASSIUM SERPL-SCNC: 4 MMOL/L — SIGNIFICANT CHANGE UP (ref 3.5–5.3)
RBC # BLD: 4.71 M/UL — SIGNIFICANT CHANGE UP (ref 3.8–5.2)
RBC # FLD: 12.3 % — SIGNIFICANT CHANGE UP (ref 10.3–14.5)
SODIUM SERPL-SCNC: 140 MMOL/L — SIGNIFICANT CHANGE UP (ref 135–145)
WBC # BLD: 8.53 K/UL — SIGNIFICANT CHANGE UP (ref 3.8–10.5)
WBC # FLD AUTO: 8.53 K/UL — SIGNIFICANT CHANGE UP (ref 3.8–10.5)

## 2021-06-09 PROCEDURE — 87086 URINE CULTURE/COLONY COUNT: CPT

## 2021-06-09 PROCEDURE — 80048 BASIC METABOLIC PNL TOTAL CA: CPT

## 2021-06-09 PROCEDURE — G0463: CPT

## 2021-06-09 PROCEDURE — 85027 COMPLETE CBC AUTOMATED: CPT

## 2021-06-09 RX ORDER — CIPROFLOXACIN LACTATE 400MG/40ML
400 VIAL (ML) INTRAVENOUS ONCE
Refills: 0 | Status: DISCONTINUED | OUTPATIENT
Start: 2021-06-14 | End: 2021-06-28

## 2021-06-09 RX ORDER — SODIUM CHLORIDE 9 MG/ML
3 INJECTION INTRAMUSCULAR; INTRAVENOUS; SUBCUTANEOUS EVERY 8 HOURS
Refills: 0 | Status: DISCONTINUED | OUTPATIENT
Start: 2021-06-14 | End: 2021-06-28

## 2021-06-09 RX ORDER — OMEPRAZOLE 10 MG/1
1 CAPSULE, DELAYED RELEASE ORAL
Qty: 0 | Refills: 0 | DISCHARGE

## 2021-06-09 RX ORDER — LIDOCAINE HCL 20 MG/ML
0.2 VIAL (ML) INJECTION ONCE
Refills: 0 | Status: DISCONTINUED | OUTPATIENT
Start: 2021-06-14 | End: 2021-06-28

## 2021-06-09 NOTE — H&P PST ADULT - NSICDXPASTMEDICALHX_GEN_ALL_CORE_FT
PAST MEDICAL HISTORY:  MILADY (Landin's esophagus)     GERD (gastroesophageal reflux disease)     HLD (hyperlipidemia) on no med    IBS (irritable bowel syndrome)     MVP (mitral valve prolapse)     NORMAN (obstructive sleep apnea) Mild    Renal stone

## 2021-06-09 NOTE — H&P PST ADULT - HISTORY OF PRESENT ILLNESS
This is a 55 y/o female a routine follow up CT revealed + renal calculus , she presents today for left ureteroscopy.  covid swab to be done 6/11 at [facility], denies fever, cough, SOB This is a 57 y/o female a routine follow up CT revealed + increasing left renal calculus , she presents today for left ureteroscopy.  covid swab to be done 6/11 at [facility], denies fever, cough, SOB

## 2021-06-09 NOTE — H&P PST ADULT - NSICDXPROBLEM_GEN_ALL_CORE_FT
PROBLEM DIAGNOSES  Problem: Calculus of kidney  Assessment and Plan: left ureteroscopy    Problem: Obstructive sleep apnea  Assessment and Plan: NORMAN precaution, notified OR booking

## 2021-06-09 NOTE — H&P PST ADULT - NSICDXPASTSURGICALHX_GEN_ALL_CORE_FT
PAST SURGICAL HISTORY:  H/O bilateral breast reduction surgery 2004    H/O lithotripsy     S/P cystoscopy with ureteral stent placement

## 2021-06-11 ENCOUNTER — OUTPATIENT (OUTPATIENT)
Dept: OUTPATIENT SERVICES | Facility: HOSPITAL | Age: 56
LOS: 1 days | End: 2021-06-11
Payer: MEDICARE

## 2021-06-11 DIAGNOSIS — Z98.890 OTHER SPECIFIED POSTPROCEDURAL STATES: Chronic | ICD-10-CM

## 2021-06-11 DIAGNOSIS — Z96.0 PRESENCE OF UROGENITAL IMPLANTS: Chronic | ICD-10-CM

## 2021-06-11 DIAGNOSIS — Z11.52 ENCOUNTER FOR SCREENING FOR COVID-19: ICD-10-CM

## 2021-06-11 LAB
CULTURE RESULTS: SIGNIFICANT CHANGE UP
SPECIMEN SOURCE: SIGNIFICANT CHANGE UP

## 2021-06-12 LAB — SARS-COV-2 RNA SPEC QL NAA+PROBE: SIGNIFICANT CHANGE UP

## 2021-06-13 ENCOUNTER — TRANSCRIPTION ENCOUNTER (OUTPATIENT)
Age: 56
End: 2021-06-13

## 2021-06-14 ENCOUNTER — APPOINTMENT (OUTPATIENT)
Dept: UROLOGY | Facility: HOSPITAL | Age: 56
End: 2021-06-14

## 2021-06-14 ENCOUNTER — OUTPATIENT (OUTPATIENT)
Dept: OUTPATIENT SERVICES | Facility: HOSPITAL | Age: 56
LOS: 1 days | End: 2021-06-14
Payer: MEDICARE

## 2021-06-14 ENCOUNTER — RESULT REVIEW (OUTPATIENT)
Age: 56
End: 2021-06-14

## 2021-06-14 ENCOUNTER — APPOINTMENT (OUTPATIENT)
Dept: CARDIOLOGY | Facility: CLINIC | Age: 56
End: 2021-06-14

## 2021-06-14 VITALS
HEART RATE: 66 BPM | SYSTOLIC BLOOD PRESSURE: 105 MMHG | RESPIRATION RATE: 18 BRPM | HEIGHT: 68 IN | OXYGEN SATURATION: 97 % | DIASTOLIC BLOOD PRESSURE: 72 MMHG | TEMPERATURE: 98 F | WEIGHT: 173.94 LBS

## 2021-06-14 VITALS
HEART RATE: 61 BPM | SYSTOLIC BLOOD PRESSURE: 108 MMHG | TEMPERATURE: 98 F | OXYGEN SATURATION: 98 % | RESPIRATION RATE: 16 BRPM | DIASTOLIC BLOOD PRESSURE: 68 MMHG

## 2021-06-14 DIAGNOSIS — Z98.890 OTHER SPECIFIED POSTPROCEDURAL STATES: Chronic | ICD-10-CM

## 2021-06-14 DIAGNOSIS — N20.0 CALCULUS OF KIDNEY: ICD-10-CM

## 2021-06-14 DIAGNOSIS — Z96.0 PRESENCE OF UROGENITAL IMPLANTS: Chronic | ICD-10-CM

## 2021-06-14 LAB — HCG UR QL: NEGATIVE — SIGNIFICANT CHANGE UP

## 2021-06-14 PROCEDURE — U0005: CPT

## 2021-06-14 PROCEDURE — 74420 UROGRAPHY RTRGR +-KUB: CPT | Mod: 26

## 2021-06-14 PROCEDURE — 52356 CYSTO/URETERO W/LITHOTRIPSY: CPT

## 2021-06-14 PROCEDURE — 76000 FLUOROSCOPY <1 HR PHYS/QHP: CPT

## 2021-06-14 PROCEDURE — 81025 URINE PREGNANCY TEST: CPT

## 2021-06-14 PROCEDURE — U0003: CPT

## 2021-06-14 PROCEDURE — C9803: CPT

## 2021-06-14 PROCEDURE — 88300 SURGICAL PATH GROSS: CPT

## 2021-06-14 PROCEDURE — 52356 CYSTO/URETERO W/LITHOTRIPSY: CPT | Mod: LT

## 2021-06-14 PROCEDURE — C2617: CPT

## 2021-06-14 PROCEDURE — C1889: CPT

## 2021-06-14 PROCEDURE — C1758: CPT

## 2021-06-14 PROCEDURE — C1769: CPT

## 2021-06-14 PROCEDURE — 82365 CALCULUS SPECTROSCOPY: CPT

## 2021-06-14 PROCEDURE — 88300 SURGICAL PATH GROSS: CPT | Mod: 26

## 2021-06-14 RX ORDER — TAMSULOSIN HYDROCHLORIDE 0.4 MG/1
1 CAPSULE ORAL
Qty: 15 | Refills: 0
Start: 2021-06-14 | End: 2021-06-28

## 2021-06-14 RX ORDER — ACETAMINOPHEN 500 MG
3 TABLET ORAL
Qty: 0 | Refills: 0 | DISCHARGE
Start: 2021-06-14

## 2021-06-14 RX ORDER — AZTREONAM 2 G
1 VIAL (EA) INJECTION
Qty: 2 | Refills: 0
Start: 2021-06-14 | End: 2021-06-14

## 2021-06-14 RX ORDER — HYDROMORPHONE HYDROCHLORIDE 2 MG/ML
0.5 INJECTION INTRAMUSCULAR; INTRAVENOUS; SUBCUTANEOUS
Refills: 0 | Status: DISCONTINUED | OUTPATIENT
Start: 2021-06-14 | End: 2021-06-14

## 2021-06-14 RX ORDER — DEXLANSOPRAZOLE 30 MG/1
1 CAPSULE, DELAYED RELEASE ORAL
Qty: 0 | Refills: 0 | DISCHARGE

## 2021-06-14 RX ORDER — ACETAMINOPHEN 500 MG
975 TABLET ORAL EVERY 6 HOURS
Refills: 0 | Status: DISCONTINUED | OUTPATIENT
Start: 2021-06-14 | End: 2021-06-28

## 2021-06-14 RX ORDER — IBUPROFEN 200 MG
1 TABLET ORAL
Qty: 9 | Refills: 0
Start: 2021-06-14 | End: 2021-06-16

## 2021-06-14 RX ORDER — OXYCODONE HYDROCHLORIDE 5 MG/1
10 TABLET ORAL EVERY 4 HOURS
Refills: 0 | Status: DISCONTINUED | OUTPATIENT
Start: 2021-06-14 | End: 2021-06-14

## 2021-06-14 RX ORDER — OXYCODONE HYDROCHLORIDE 5 MG/1
1 TABLET ORAL
Qty: 4 | Refills: 0
Start: 2021-06-14 | End: 2021-06-14

## 2021-06-14 RX ORDER — OXYCODONE HYDROCHLORIDE 5 MG/1
5 TABLET ORAL EVERY 4 HOURS
Refills: 0 | Status: DISCONTINUED | OUTPATIENT
Start: 2021-06-14 | End: 2021-06-14

## 2021-06-14 RX ORDER — SODIUM CHLORIDE 9 MG/ML
1000 INJECTION, SOLUTION INTRAVENOUS
Refills: 0 | Status: DISCONTINUED | OUTPATIENT
Start: 2021-06-14 | End: 2021-06-28

## 2021-06-14 NOTE — ASU DISCHARGE PLAN (ADULT/PEDIATRIC) - CARE PROVIDER_API CALL
Hoenig, David M (MD)  Urology  [facility]- Dept. of Urology, [address] [place] [address]  Phone: [phone]  Fax: [phone]  Follow Up Time: 1 week

## 2021-06-14 NOTE — ASU PATIENT PROFILE, ADULT - NS PRO ABUSE SCREEN AFRAID ANYONE YN
no
Exclude Pending Lab and Radiology orders from printing on the Patient's Discharge Instructions, due to Privacy Concerns.

## 2021-06-14 NOTE — BRIEF OPERATIVE NOTE - NSICDXBRIEFPROCEDURE_GEN_ALL_CORE_FT
PROCEDURES:  Ureteroscopy with laser lithotripsy and stent placement 14-Jun-2021 10:34:36  Hoenig, David

## 2021-06-14 NOTE — ASU PATIENT PROFILE, ADULT - WILL THE PATIENT ACCEPT THE PFIZER COVID-19 VACCINE IF ELIGIBLE AND IT IS AVAILABLE?
"Infusion Nursing Note:  Janice Mayfield presents today for Remicade.    Patient seen by provider today: No   present during visit today: Not Applicable.    Note: N/A.    Intravenous Access:  Peripheral IV placed.    Treatment Conditions:  Rheumatology Infusion Checklist: PRIOR TO INFUSION OF BIOLOGICAL MEDICATIONS OR ANY OF THESE AS LISTED: Remicaide (infliximab) \".rheumbiologicalchecklist\"    Prior to Infusion of biological medications or any of these as listed:    1. Elevated temperature, fever, chills, productive cough or abnormal vital signs, night sweats, coughing up blood or sputum, no appetite or abnormal vital signs : NO    2. Open wounds or new incisions: NO    3. Recent hospitalization: NO    4.  Recent surgeries:  NO    5. Any upcoming surgeries or dental procedures?:NO    6. Any current or recent bouts of illness or infection? On any antibiotics? : NO    7. Any new, sudden or worsening abdominal pain :NO    8. Vaccination within 4 weeks? Patient or someone in the household is scheduled to receive vaccination? No live virus vaccines prior to or during treatment :NO    9. Any nervous system diseases [i.e. multiple sclerosis, Guillain-Decker, seizures, neurological  changes]: NO    10. Pregnant or breast feeding; or plans on pregnancy in the future: NO    11. Signs of worsening depression or suicidal ideations while taking benlysta:NO    12. New-onset medical symptoms: NO    13.  New cancer diagnosis or on chemotherapy or radiation NO    14.  Evaluate for any sign of active TB [Unexplained weight loss, Loss of appetite, Night sweats, Fever, Fatigue, Chills, Coughing for 3 weeks or longer, Hemoptysis (coughing up blood), Chest pain]: NO    **Note: If answered yes to any of the above, hold the infusion and contact ordering rheumatologist or on-call rheumatologist.   .        Post Infusion Assessment:  Patient tolerated infusion without incident.  Blood return noted pre and post " infusion.  Site patent and intact, free from redness, edema or discomfort.  Access discontinued per protocol.  Rheumatology Post Infusion: POST-INFUSION OF BIOLOGICAL MEDICATION:    Reviewed with patient.  Given biologic medication or medication hand-out. Inform patient if any fever, chills or signs of infection, new symptoms, abdominal pain, heart palpitations, shortness of breath, reaction, weakness, neurological changes, seek medical attention immediately and should not receive infusions. No live virus vaccines prior to or during treatment or up to 6 months post infusion. If the patient has an upcoming procedure or surgery, this should be discussed with the rheumatologist and surgeon or provider..      Discharge Plan:   Patient discharged in stable condition accompanied by: self.  Departure Mode: Ambulatory.    Lucrecia Manuel RN                     Not applicable

## 2021-06-14 NOTE — ASU DISCHARGE PLAN (ADULT/PEDIATRIC) - ASU DC SPECIAL INSTRUCTIONSFT
PAIN CONTROL: You may take 975 mg of Tylenol every 6 hours. Do not exceed more than 4000mg or 4 grams of Tylenol daily. You may take Ibuprofen 800mg every 8 hours as needed for moderate pain. You have been prescribed oxycodone 5mg every 6 hours as needed for any breakthrough pain. Please also take Flomax to help with stent discomfort.    STENT: You may have intermittent pink tinged urine and slight flank pain when you urinate.  This is normal and due to the stent in your ureter. It is not uncommon to have some burning when you urinate.  Some patients do not feel any discomfort from the stent, while others may feel the sensation of needing to urinate frequently, burning on urination, or even back pain that worsens with urination. These symptoms usually improve gradually, however it may be necessary to take pain medication until the discomfort subsides.  If you are unable to urinate or your urine becomes bright red or with clots, please call the office. Please make sure you drink plenty of fluids.     ANTIBIOTICS: Please complete the course of antibiotics sent your pharmacy as instructed.    BATHING: Please do not submerge wound underwater. You may shower and/or sponge bathe.    ACTIVITY: No heavy lifting or straining. Otherwise, you may return to your usual level of physical activity. If you are taking narcotic pain medications (such as oxycodone), do NOT drive a car, operate machinery or make important decisions.    DIET: Return to your usual diet    NOTIFY YOUR SURGEON IF: You have any bleeding that does not stop, any fevers (over 100.4F) or chills, persistent nausea/vomiting, persistent diarrhea, your pain is not controlled on your discharge pain medications, or other worrisome symptoms arise.    FOLLOW UP:  1. Please call your surgeon to make a follow up appointment within one week of discharge  2: Please follow up with your primary care physician within 1-2 weeks regarding your hospitalization.

## 2021-06-15 PROBLEM — E78.5 HYPERLIPIDEMIA, UNSPECIFIED: Chronic | Status: ACTIVE | Noted: 2020-03-20

## 2021-06-18 ENCOUNTER — APPOINTMENT (OUTPATIENT)
Dept: UROLOGY | Facility: CLINIC | Age: 56
End: 2021-06-18
Payer: MEDICARE

## 2021-06-18 ENCOUNTER — APPOINTMENT (OUTPATIENT)
Dept: UROLOGY | Facility: CLINIC | Age: 56
End: 2021-06-18

## 2021-06-18 LAB — NIDUS STONE QN: SIGNIFICANT CHANGE UP

## 2021-06-18 PROCEDURE — 99214 OFFICE O/P EST MOD 30 MIN: CPT

## 2021-06-18 NOTE — ASSESSMENT
[FreeTextEntry1] : most recent 24 hour urine-- low citrate ~ 350, but calcium excellent on current HCTZ 25 mg--> 165.\par \par Citrate better in the past, but pt now limited as far as diet, rhonda citrus, due to Landin's esophagus.\par \par Remaining 24 hour urine elements overall, excellent.\par \par Diet modification reviewed at length- increasing fluids (primarily water), citrus is good, and decreasing/moderating salt, animal flesh protein, oxalate containing foods, and moderation of calcium intake (1000 mg/day is USRDA).\par \par I reviewed with the patient the risks of metabolic stone disease given their underlying risk parameters (all of which include large stones, multiple stones, bilateral stones, family history, and young age), and the indications for 24 hour urine metabolic assessment.\par \par We also discussed benefits of regular exercise and weight loss as independent risk reducers for stones.\par \par 1. Diet modification as discussed\par 2. Hold 24 hour urine given recent\par 3. RTC with renal US in 8 to 10 weeks\par 4. cont HCTZ\par 5. consider citrate supplementation with litholyte as pt unlikely to be able to use pot citrate due to Landin's

## 2021-06-18 NOTE — HISTORY OF PRESENT ILLNESS
[FreeTextEntry1] : Pt returns for metabolic evaluation and prevention of future stone disease following recent surgery for stone.  At issue today is review of the stone analysis, risk factors for future stone episodes and establishing whether they have pure dietary, metabolic, or mixed causes for their stone risks which is unrelated to the surgical management of their stone disease.\par \par They underwent left URS with laser/stone removal, left stent on 6/14/21 (4 days ago)\par \par Stent status: strings, in for removal today\par \par Stone analysis: 70% Calcium oxalate monohydrate\par 30% Calcium phosphate (apatite)\par  [Currently Experiencing ___] :  [unfilled] [Hematuria - Gross] : gross hematuria

## 2021-06-18 NOTE — PHYSICAL EXAM
[General Appearance - Well Developed] : well developed [General Appearance - Well Nourished] : well nourished [Normal Appearance] : normal appearance [Well Groomed] : well groomed [General Appearance - In No Acute Distress] : no acute distress [Abdomen Soft] : soft [Abdomen Tenderness] : non-tender [Costovertebral Angle Tenderness] : no ~M costovertebral angle tenderness [External Female Genitalia] : normal external genitalia [FreeTextEntry1] : strings for stent [] : no respiratory distress [Respiration, Rhythm And Depth] : normal respiratory rhythm and effort [Exaggerated Use Of Accessory Muscles For Inspiration] : no accessory muscle use [Oriented To Time, Place, And Person] : oriented to person, place, and time [Affect] : the affect was normal [Mood] : the mood was normal [Not Anxious] : not anxious [Normal Station and Gait] : the gait and station were normal for the patient's age [No Focal Deficits] : no focal deficits

## 2021-07-14 LAB — SURGICAL PATHOLOGY STUDY: SIGNIFICANT CHANGE UP

## 2021-08-20 ENCOUNTER — APPOINTMENT (OUTPATIENT)
Dept: UROLOGY | Facility: CLINIC | Age: 56
End: 2021-08-20

## 2021-10-11 ENCOUNTER — NON-APPOINTMENT (OUTPATIENT)
Age: 56
End: 2021-10-11

## 2021-10-11 ENCOUNTER — APPOINTMENT (OUTPATIENT)
Dept: CARDIOLOGY | Facility: CLINIC | Age: 56
End: 2021-10-11
Payer: MEDICARE

## 2021-10-11 VITALS
BODY MASS INDEX: 28.09 KG/M2 | HEART RATE: 76 BPM | TEMPERATURE: 97.8 F | WEIGHT: 179 LBS | SYSTOLIC BLOOD PRESSURE: 120 MMHG | RESPIRATION RATE: 16 BRPM | DIASTOLIC BLOOD PRESSURE: 70 MMHG | OXYGEN SATURATION: 97 % | HEIGHT: 67 IN

## 2021-10-11 DIAGNOSIS — Z87.898 PERSONAL HISTORY OF OTHER SPECIFIED CONDITIONS: ICD-10-CM

## 2021-10-11 PROCEDURE — 93000 ELECTROCARDIOGRAM COMPLETE: CPT

## 2021-10-11 PROCEDURE — 99214 OFFICE O/P EST MOD 30 MIN: CPT | Mod: 25

## 2021-10-11 NOTE — CARDIOLOGY SUMMARY
[___] : [unfilled] [de-identified] : 10/11/2021 [de-identified] : 24 hour Holter Monitor [de-identified] : 2/2/2021 [de-identified] : 2/2/2021

## 2021-10-11 NOTE — REVIEW OF SYSTEMS
[Palpitations] : palpitations [Negative] : Heme/Lymph [SOB] : no shortness of breath [Dyspnea on exertion] : not dyspnea during exertion [Chest Discomfort] : no chest discomfort [Lower Ext Edema] : no extremity edema [Leg Claudication] : no intermittent leg claudication [Orthopnea] : no orthopnea [PND] : no PND [Syncope] : no syncope

## 2021-10-11 NOTE — ASSESSMENT
[FreeTextEntry1] : This is a 56 year year old female here today for follow up cardiac evaluation. \par She has a past medical history significant for mitral valve prolapse, Landin's esophagus, nephrolithiasis, psoriasis.\par \par CHIEF COMPLAINT:\par Today she is feeling generally well and does not have any complaints at this time. She states that her palpitations have greatly improved and she believes they were being caused by a disc in her back which may have been pressing on her spinal cord. She states that she has been following up with her neurologist closely. She would also like to make a note that she is only taking 12.5mg of her HCTZ instead of 25mg because she felt "weaker" on the full dose.\par \par -She states she has been trying to work on better diet and exercise to help with her cholesterol although she has a history for FH.\par \par -She does not drink excessive caffeine or alcohol.  She has no history of rheumatic fever.  She has been under a lot of stress recently related to the loss of her .  Her cardiac risk factors include hypercholesterolemia.\par \par BLOOD PRESSURE:\par -BP is well controlled in today's visit and patient is on HCTZ 12.5mg PO DAILY. \par \par -I have discussed the importance of maintaining good BP control and reviewed the newest guidelines with the patient while re-enforcing dietary sodium restrictions to no more than 2-3 g daily, DASH diet, life style modifications as well as the goal of maintaining ideal body weight with the patient today. I have advised the patient to avoid the use of over-the-counter medications/ supplements especially NSAIDS.\par \par I have reviewed with Ms. PAGAN that serious health consequences can occur when blood pressure is not well controlled and the need for strict compliance with medication and that optimal control can significantly reduce the risk of cardiovascular disease stroke, heart attack and other organ damage. They verbally expressed understanding of the fore mentioned serious health consequences to me today.\par \par BLOOD WORK:\par -New blood work was done today, 10/11/2021 to evaluate lipid profile, CBC, BMP, hepatic function, A1C and TSH. Pt was previously prescribed Rosuvastatin 20mg PO DAILY however she reports feelings of severe leg/muscle aches while on statins. We will repeat blood work today and will trial her on Zetia 10mg  PO Daily or Nexlizet 180/10mg PO Daily. She has a history of FH and is concerned about her risk for CAD/blockage since her   from an MI.  \par -She is interested in a Ca score as well. \par \par CHOLESTEROL CONTROL:\par -Patient will continue the advised  TLC diet and to continue follow-up for treatment of hyperlipidemia and repeat blood testing with diet and exercise. I have discussed different exercises and the importance of maintenance of optimal body weight. The importance of staying within guidelines and recommendations was stressed to the patient today and they acknowledged that they understand this to me verbally.\par \par TESTING/REPORTS:\par -EKG done Oct 11, 2021 which demonstrated regular sinus rhythm with nonspecific ST-T wave changes, BPM of 76.\par \par -The patient had normal exercise stress test 2021.\par \par -Echo Doppler examination done 2021 demonstrated minimal to mild mitral and tricuspid valve regurgitation, physiologic pulmonic valve regurgitation, normal left ventricular function, and estimated ejection fraction 68%.\par \par PLAN:\par -Ca score\par -Blood work done today to evaluate lipid profile.\par -Stay on HCTZ 12.5mg PO DAILY. \par \par I have discussed the plan of care with Ms. JOSSUE PAGAN  and she  will follow up in 3 months. She is compliant with all of her medications.\par \par The patient understands that aerobic exercises must be increased to minutes 4 times/week and a detailed discussion of lifestyle modification was done today. \par The patient has a good understanding of the diagnosis, treatment plan and lifestyle modification. \par She will contact me at the office for any questions with their care or any changes in their health status.\par \par The plan of care was discussed with the patient with supervision physician Dr. Az Snowden and will be carried out as noted above.\par \par Kelsey SOLORIO

## 2021-10-11 NOTE — PHYSICAL EXAM
[Well Developed] : well developed [Well Nourished] : well nourished [No Acute Distress] : no acute distress [Normal Venous Pressure] : normal venous pressure [No Carotid Bruit] : no carotid bruit [Normal S1, S2] : normal S1, S2 [No Rub] : no rub [Murmur] : murmur [Clear Lung Fields] : clear lung fields [Good Air Entry] : good air entry [No Respiratory Distress] : no respiratory distress  [Soft] : abdomen soft [Non Tender] : non-tender [No Masses/organomegaly] : no masses/organomegaly [Normal Bowel Sounds] : normal bowel sounds [Normal Gait] : normal gait [No Edema] : no edema [No Cyanosis] : no cyanosis [No Clubbing] : no clubbing [No Varicosities] : no varicosities [No Rash] : no rash [No Skin Lesions] : no skin lesions [Moves all extremities] : moves all extremities [No Focal Deficits] : no focal deficits [Normal Speech] : normal speech [Alert and Oriented] : alert and oriented [Normal memory] : normal memory [General Appearance - Well Developed] : well developed [Normal Appearance] : normal appearance [General Appearance - Well Nourished] : well nourished [Normal Conjunctiva] : the conjunctiva exhibited no abnormalities [Normal Oral Mucosa] : normal oral mucosa [No Oral Pallor] : no oral pallor [Normal Oropharynx] : normal oropharynx [Normal Jugular Venous V Waves Present] : normal jugular venous V waves present [Exaggerated Use Of Accessory Muscles For Inspiration] : no accessory muscle use [Respiration, Rhythm And Depth] : normal respiratory rhythm and effort [Auscultation Breath Sounds / Voice Sounds] : lungs were clear to auscultation bilaterally [Bowel Sounds] : normal bowel sounds [Abdomen Soft] : soft [Abnormal Walk] : normal gait [Nail Clubbing] : no clubbing of the fingernails [Cyanosis, Localized] : no localized cyanosis [Skin Turgor] : normal skin turgor [] : no rash [Oriented To Time, Place, And Person] : oriented to person, place, and time [Impaired Insight] : insight and judgment were intact [Affect] : the affect was normal [5th Left ICS - MCL] : palpated at the 5th LICS in the midclavicular line [Normal] : normal [No Precordial Heave] : no precordial heave was noted [Normal Rate] : normal [Rhythm Regular] : regular [Normal S1] : normal S1 [Normal S2] : normal S2 [No Gallop] : no gallop heard [I] : a grade 1 [2+] : left 2+ [No Abnormalities] : the abdominal aorta was not enlarged and no bruit was heard [No Pitting Edema] : no pitting edema present [Apical Thrill] : no thrill palpable at the apex [Right Carotid Bruit] : no bruit heard over the right carotid [Left Carotid Bruit] : no bruit heard over the left carotid [Right Femoral Bruit] : no bruit heard over the right femoral artery [Left Femoral Bruit] : no bruit heard over the left femoral artery [S3] : no S3 [S4] : no S4 [Click] : no click [Pericardial Rub] : no pericardial rub

## 2021-10-11 NOTE — DISCUSSION/SUMMARY
[FreeTextEntry1] : Dr. Snowden-(PRIOR VISIT and PMH WITH Dr. Snowden): \par This is a 56-year-old female with past medical history significant mitral valve prolapse, Landin's esophagus, nephrolithiasis, psoriasis, who comes in for cardiac follow-up evaluation.\par She denies chest pain, shortness of breath, dizziness or syncope.\par \par The patient is complaining of a sensation of tremors underneath her skin involving her arms and legs and chest.  There is no movement dyskinesis or disorder, or subjective findings.\par \par The patient will have a Holter monitor done to rule out significant arrhythmia or heart block and to evaluate her previous palpitations.\par \par The patient is also concerned about early coronary artery disease, given her 's history of an acute cardiac event and she will schedule a coronary artery calcium score.\par The patient discontinued Crestor 20 mg daily on her own March 3, 2021 due to feelings of fatigue and backaches.  She stopped the medication and "felt well".\par The patient will continue to work on her diet and exercise program, however if her LDL is consistent with heterozygous familial hypercholesterolemia, statin therapy to reduce LDL to a target of less than 100 mg/dL is appropriate.\par \par The patient is currently getting epidural injections.  I have asked her to follow-up with her neurologist regarding these subcutaneous feelings.\par \par Echo Doppler examination done February 2, 2021 demonstrated minimal to mild mitral and tricuspid valve regurgitation, physiologic pulmonic valve regurgitation, normal left ventricular function, and estimated ejection fraction 68%.\par \par The patient had elevated LDL cholesterol, consistent with heterozygous hypercholesterolemia, and was started on Crestor therapy, and the patient is currently taking no lipid-lowering therapy.\par The patient had a lipid panel done April 12, 2021 which demonstrated total cholesterol 236, calculated  mg/dL, non-HDL cholesterol 180 mg/dL and HDL of 56 mg/dL, and direct LDL cholesterol 161 mg/dL.\par She is currently hemodynamically stable and asymptomatic from a cardiac standpoint.\par \par The patient had normal exercise stress test February 2, 2021.\par She will have an echo Doppler examination February 2, 2021 to rule out mitral valve prolapse.\par Electrocardiogram done January 12, 2021 demonstrated normal sinus rhythm rate 64 bpm and is otherwise unremarkable.\par \par Patient had blood work done January 4, 2021 which demonstrated a total cholesterol 276 mg/dL, HDL 69 mg/dL, calculated LDL cholesterol 190 mg/dL.\par These findings are consistent with heterozygous familial hypercholesterolemia.\par She is also concerned about her children, given her 's sudden death felt to be related to myocardial infarction.  \par \par She has been complaining of a buzzing sensation in her chest since December 2020.  She denies a specific rapid heartbeat palpitation, or skipped heartbeat.\par \par She does not drink excessive caffeine or alcohol.  She has no history of rheumatic fever.  She has been under a lot of stress recently related to the loss of her .  Her cardiac risk factors include hypercholesterolemia.\par \par Blood work-January 4, 2021 demonstrated a calculated LDL cholesterol 190 mg/dL, total cholesterol 276 mg/dL, HDL 69 mg/dL, triglycerides 86 mg/dL and are consistent with familial heterozygous hypercholesterolemia.  \par \par I have also asked her to make an appointment with our registered dietitian.  \par \par The patient understands that aerobic exercises must be increased to 40 minutes 4 times per week. A detailed discussion of lifestyle modification was done today. The patient has a good understanding of the diagnosis, and treatment plan. Lifestyle modification was also outlined.\par  \par

## 2021-10-11 NOTE — REASON FOR VISIT
[CV Risk Factors and Non-Cardiac Disease] : CV risk factors and non-cardiac disease [Arrhythmia/ECG Abnorrmalities] : arrhythmia/ECG abnormalities [Structural Heart and Valve Disease] : structural heart and valve disease [FreeTextEntry1] : This is a 56 year year old female here today for follow up cardiac evaluation. \par She has a past medical history significant for mitral valve prolapse, Landin's esophagus, nephrolithiasis, psoriasis.\par \par CHIEF COMPLAINT:\par Today she is feeling generally well and does not have any complaints at this time. She states that her palpitations have greatly improved and she believes they were being caused by a disc in her back which may have been pressing on her spinal cord. She states that she has been following up with her neurologist closely. She would also like to make a note that she is only taking 12.5mg of her HCTZ instead of 25mg because she felt "weaker" on the full dose.\par \par She denies fever, chills, weight loss, malaise, rash, alteration bowel habits, weakness, abdominal  pain, bloating, changes in urination, visual disturbances, chest pain, headaches, dizziness, heart palpitations, recent episodes of syncope or falls at this time.\par \par

## 2021-10-19 ENCOUNTER — TRANSCRIPTION ENCOUNTER (OUTPATIENT)
Age: 56
End: 2021-10-19

## 2021-12-02 ENCOUNTER — OUTPATIENT (OUTPATIENT)
Dept: OUTPATIENT SERVICES | Facility: HOSPITAL | Age: 56
LOS: 1 days | End: 2021-12-02
Payer: COMMERCIAL

## 2021-12-02 ENCOUNTER — RESULT REVIEW (OUTPATIENT)
Age: 56
End: 2021-12-02

## 2021-12-02 ENCOUNTER — APPOINTMENT (OUTPATIENT)
Dept: CT IMAGING | Facility: CLINIC | Age: 56
End: 2021-12-02
Payer: SELF-PAY

## 2021-12-02 DIAGNOSIS — Z98.890 OTHER SPECIFIED POSTPROCEDURAL STATES: Chronic | ICD-10-CM

## 2021-12-02 DIAGNOSIS — Z96.0 PRESENCE OF UROGENITAL IMPLANTS: Chronic | ICD-10-CM

## 2021-12-02 DIAGNOSIS — Z00.8 ENCOUNTER FOR OTHER GENERAL EXAMINATION: ICD-10-CM

## 2021-12-02 PROCEDURE — 75571 CT HRT W/O DYE W/CA TEST: CPT | Mod: 26

## 2021-12-02 PROCEDURE — 75571 CT HRT W/O DYE W/CA TEST: CPT

## 2021-12-09 ENCOUNTER — RX CHANGE (OUTPATIENT)
Age: 56
End: 2021-12-09

## 2021-12-09 ENCOUNTER — NON-APPOINTMENT (OUTPATIENT)
Age: 56
End: 2021-12-09

## 2022-03-23 ENCOUNTER — APPOINTMENT (OUTPATIENT)
Dept: UROLOGY | Facility: CLINIC | Age: 57
End: 2022-03-23
Payer: MEDICARE

## 2022-03-23 ENCOUNTER — OUTPATIENT (OUTPATIENT)
Dept: OUTPATIENT SERVICES | Facility: HOSPITAL | Age: 57
LOS: 1 days | End: 2022-03-23
Payer: COMMERCIAL

## 2022-03-23 DIAGNOSIS — N28.1 CYST OF KIDNEY, ACQUIRED: ICD-10-CM

## 2022-03-23 DIAGNOSIS — R35.0 FREQUENCY OF MICTURITION: ICD-10-CM

## 2022-03-23 DIAGNOSIS — R82.994 HYPERCALCIURIA: ICD-10-CM

## 2022-03-23 DIAGNOSIS — Z98.890 OTHER SPECIFIED POSTPROCEDURAL STATES: Chronic | ICD-10-CM

## 2022-03-23 DIAGNOSIS — Q61.5 MEDULLARY CYSTIC KIDNEY: ICD-10-CM

## 2022-03-23 DIAGNOSIS — N20.0 CALCULUS OF KIDNEY: ICD-10-CM

## 2022-03-23 DIAGNOSIS — R82.991 HYPOCITRATURIA: ICD-10-CM

## 2022-03-23 DIAGNOSIS — Z96.0 PRESENCE OF UROGENITAL IMPLANTS: Chronic | ICD-10-CM

## 2022-03-23 PROCEDURE — 76775 US EXAM ABDO BACK WALL LIM: CPT | Mod: 26

## 2022-03-23 PROCEDURE — 99214 OFFICE O/P EST MOD 30 MIN: CPT | Mod: 25

## 2022-03-23 PROCEDURE — 76775 US EXAM ABDO BACK WALL LIM: CPT

## 2022-03-23 NOTE — HISTORY OF PRESENT ILLNESS
[FreeTextEntry1] : Pt returns for metabolic evaluation and prevention of future stone disease following recent surgery for stone.  At issue today is review of the stone analysis, risk factors for future stone episodes and establishing whether they have pure dietary, metabolic, or mixed causes for their stone risks which is unrelated to the surgical management of their stone disease. 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

## 2022-03-23 NOTE — ASSESSMENT
[FreeTextEntry1] : Renal US reviewed: septated lower pole cyst in the right kidney measuring 6.4 x 3.7 x 4.8 cm with no vascularity previously measured 6.5 x 5.0 x 5.0 cm. A curvilinear echogenic focus with shadow and twinkle artifact measuring 1.8 cm visualized inferior to the lower pole cyst of the right kidney, outside kidney. There is slight fullness of the right renal pelvis without hydro. A cluster of two or more echogenic foci with twinkle artifact but with out shadow visualized in the lower pole of left kidney 5.5 mm and 4.5 mm, unsure if stone for both, but one appears to shadow. Both kidneys are normal in size and echogenicity without hydronephrosis or solid masses visualized. \par \par unable to joshua higher dose of HCTZ, or potassium citrate at this time\par \par Will cont to f/u\par 24 hour urine - will review over phone or tele current\par RTC 6 months with renal US

## 2022-03-23 NOTE — PHYSICAL EXAM
[General Appearance - Well Developed] : well developed [General Appearance - Well Nourished] : well nourished [Normal Appearance] : normal appearance [Well Groomed] : well groomed [General Appearance - In No Acute Distress] : no acute distress [Abdomen Soft] : soft [Abdomen Tenderness] : non-tender [Costovertebral Angle Tenderness] : no ~M costovertebral angle tenderness [External Female Genitalia] : normal external genitalia [FreeTextEntry1] : strings for stent [Edema] : no peripheral edema [] : no respiratory distress [Respiration, Rhythm And Depth] : normal respiratory rhythm and effort [Exaggerated Use Of Accessory Muscles For Inspiration] : no accessory muscle use [Oriented To Time, Place, And Person] : oriented to person, place, and time [Affect] : the affect was normal [Mood] : the mood was normal [Not Anxious] : not anxious [Normal Station and Gait] : the gait and station were normal for the patient's age [No Focal Deficits] : no focal deficits

## 2022-04-06 ENCOUNTER — APPOINTMENT (OUTPATIENT)
Dept: CARDIOLOGY | Facility: CLINIC | Age: 57
End: 2022-04-06

## 2022-05-02 ENCOUNTER — APPOINTMENT (OUTPATIENT)
Dept: CARDIOLOGY | Facility: CLINIC | Age: 57
End: 2022-05-02

## 2022-05-24 ENCOUNTER — NON-APPOINTMENT (OUTPATIENT)
Age: 57
End: 2022-05-24

## 2022-05-31 ENCOUNTER — APPOINTMENT (OUTPATIENT)
Dept: CARDIOLOGY | Facility: CLINIC | Age: 57
End: 2022-05-31
Payer: MEDICARE

## 2022-05-31 ENCOUNTER — NON-APPOINTMENT (OUTPATIENT)
Age: 57
End: 2022-05-31

## 2022-05-31 VITALS
BODY MASS INDEX: 28.88 KG/M2 | RESPIRATION RATE: 16 BRPM | WEIGHT: 184 LBS | HEIGHT: 67 IN | TEMPERATURE: 98.1 F | SYSTOLIC BLOOD PRESSURE: 120 MMHG | OXYGEN SATURATION: 98 % | HEART RATE: 63 BPM | DIASTOLIC BLOOD PRESSURE: 77 MMHG

## 2022-05-31 DIAGNOSIS — Z91.89 OTHER SPECIFIED PERSONAL RISK FACTORS, NOT ELSEWHERE CLASSIFIED: ICD-10-CM

## 2022-05-31 PROCEDURE — 93000 ELECTROCARDIOGRAM COMPLETE: CPT

## 2022-05-31 PROCEDURE — 99214 OFFICE O/P EST MOD 30 MIN: CPT | Mod: 25

## 2022-05-31 RX ORDER — ROSUVASTATIN CALCIUM 20 MG/1
20 TABLET, FILM COATED ORAL
Qty: 90 | Refills: 1 | Status: DISCONTINUED | COMMUNITY
Start: 2022-05-31 | End: 2022-05-31

## 2022-05-31 RX ORDER — EVOLOCUMAB 140 MG/ML
140 INJECTION, SOLUTION SUBCUTANEOUS
Qty: 6 | Refills: 1 | Status: DISCONTINUED | COMMUNITY
Start: 2021-12-08 | End: 2022-05-31

## 2022-05-31 NOTE — PHYSICAL EXAM
[Well Developed] : well developed [Well Nourished] : well nourished [No Acute Distress] : no acute distress [Normal Venous Pressure] : normal venous pressure [No Carotid Bruit] : no carotid bruit [Normal S1, S2] : normal S1, S2 [No Rub] : no rub [Murmur] : murmur [Clear Lung Fields] : clear lung fields [Good Air Entry] : good air entry [No Respiratory Distress] : no respiratory distress  [Soft] : abdomen soft [Non Tender] : non-tender [No Masses/organomegaly] : no masses/organomegaly [Normal Bowel Sounds] : normal bowel sounds [Normal Gait] : normal gait [No Edema] : no edema [No Cyanosis] : no cyanosis [No Clubbing] : no clubbing [No Varicosities] : no varicosities [No Rash] : no rash [No Skin Lesions] : no skin lesions [Moves all extremities] : moves all extremities [No Focal Deficits] : no focal deficits [Normal Speech] : normal speech [Alert and Oriented] : alert and oriented [Normal memory] : normal memory [General Appearance - Well Developed] : well developed [Normal Appearance] : normal appearance [General Appearance - Well Nourished] : well nourished [Normal Conjunctiva] : the conjunctiva exhibited no abnormalities [Normal Oral Mucosa] : normal oral mucosa [No Oral Pallor] : no oral pallor [Normal Oropharynx] : normal oropharynx [Normal Jugular Venous V Waves Present] : normal jugular venous V waves present [Respiration, Rhythm And Depth] : normal respiratory rhythm and effort [Exaggerated Use Of Accessory Muscles For Inspiration] : no accessory muscle use [Auscultation Breath Sounds / Voice Sounds] : lungs were clear to auscultation bilaterally [Bowel Sounds] : normal bowel sounds [Abdomen Soft] : soft [Abnormal Walk] : normal gait [Nail Clubbing] : no clubbing of the fingernails [Cyanosis, Localized] : no localized cyanosis [Skin Turgor] : normal skin turgor [] : no rash [Oriented To Time, Place, And Person] : oriented to person, place, and time [Impaired Insight] : insight and judgment were intact [Affect] : the affect was normal [5th Left ICS - MCL] : palpated at the 5th LICS in the midclavicular line [Normal] : normal [No Precordial Heave] : no precordial heave was noted [Normal Rate] : normal [Rhythm Regular] : regular [Normal S1] : normal S1 [Normal S2] : normal S2 [No Gallop] : no gallop heard [I] : a grade 1 [2+] : left 2+ [No Abnormalities] : the abdominal aorta was not enlarged and no bruit was heard [No Pitting Edema] : no pitting edema present [Apical Thrill] : no thrill palpable at the apex [Right Carotid Bruit] : no bruit heard over the right carotid [Left Carotid Bruit] : no bruit heard over the left carotid [Right Femoral Bruit] : no bruit heard over the right femoral artery [Left Femoral Bruit] : no bruit heard over the left femoral artery [S3] : no S3 [S4] : no S4 [Click] : no click [Pericardial Rub] : no pericardial rub

## 2022-05-31 NOTE — ASSESSMENT
[FreeTextEntry1] : This is a 57 year year old female here today for follow up cardiac evaluation. \par She has a past medical history significant for mitral valve prolapse, Landin's esophagus, nephrolithiasis, psoriasis.\par \par CHIEF COMPLAINT:\par Today she is feeling generally well and does not have any complaints at this time. She is currently prescribed HCTZ 12.5mg PO DAILY by her Nephrologist for management of Kidney stones and was placed on Rosuvastatin 20mg PO DAILY due to Ca score of 7 units. She does have a hx for HLD. She states that she has not been compliant with the Rosuvastatin due to her not feeling well on it (very bloated). She had issues with Rosuvastatin in the past as well and was told to rechallenge herself due to her increased CA score. She was prescribed Repatha but is not taking it at this time. \par \par She states her palpitations have greatly improved and she believes they were being caused by a disc in her back which may have been pressing on her spinal cord. She states that she has been following up with her neurologist closely. \par \par -She states she has been trying to work on better diet and exercise to help with her cholesterol although she has a history for FH.\par \par -She does not drink excessive caffeine or alcohol.  She has no history of rheumatic fever.  She has been under a lot of stress recently related to the loss of her .  Her cardiac risk factors include hypercholesterolemia.\par \par BLOOD PRESSURE:\par -BP is well controlled in today's visit and patient is on HCTZ 12.5mg PO DAILY. \par \par -I have discussed the importance of maintaining good BP control and reviewed the newest guidelines with the patient while re-enforcing dietary sodium restrictions to no more than 2-3 g daily, DASH diet, life style modifications as well as the goal of maintaining ideal body weight with the patient today. I have advised the patient to avoid the use of over-the-counter medications/ supplements especially NSAIDS.\par \par I have reviewed with Ms. PAGAN that serious health consequences can occur when blood pressure is not well controlled and the need for strict compliance with medication and that optimal control can significantly reduce the risk of cardiovascular disease stroke, heart attack and other organ damage. They verbally expressed understanding of the fore mentioned serious health consequences to me today.\par \par BLOOD WORK:\par -Blood work done at [facility] doctor's office demonstrated cholesterol of 223 and LDL of 186.\par -The patient will stop Rosuvastatin for now and will try Nexlizet 180/10mg PO Daily (I provided some samples for her until her next follow up/blood draw to see the effectiveness/her response).\par -She has a history of FH and is concerned about her risk for CAD/blockage since her   from an MI.  \par \par CHOLESTEROL CONTROL:\par -Patient will continue the advised  TLC diet and to continue follow-up for treatment of hyperlipidemia and repeat blood testing with diet and exercise. I have discussed different exercises and the importance of maintenance of optimal body weight. The importance of staying within guidelines and recommendations was stressed to the patient today and they acknowledged that they understand this to me verbally.\par \par TESTING/REPORTS:\par -EKG done today 2022 which demonstrated regular sinus rhythm with nonspecific ST-T wave changes BPM of 63. \par \par -Ca score done 2021 demonstrated 7 Units.\par \par -EKG done Oct 11, 2021 which demonstrated regular sinus rhythm with nonspecific ST-T wave changes, BPM of 76.\par \par -The patient had normal exercise stress test 2021.\par \par -Echo Doppler examination done 2021 demonstrated minimal to mild mitral and tricuspid valve regurgitation, physiologic pulmonic valve regurgitation, normal left ventricular function, and estimated ejection fraction 68%.\par \par PLAN:\par -Stop Rosuvastatin and start Nexlizet 180/10mg PO Daily \par -She will continue with her usual medications and will contact the office if she is having any complaints between now and their next follow up appointment.\par -Blood work script given to the patient to have blood work done prior to her next follow up appointment. \par -She will follow up with her nephrologist.\par -The patient will schedule an Exercise Stress Test rule out significant coronary artery disease.\par -The patient will schedule an Echo Doppler examination to evaluate murmur, left ventricular function, chamber size, and rule out hypertrophy. \par \par I have discussed the plan of care with Ms. JOSSUE LATASHA  and she  will follow up in 3 months. She is compliant with all of her medications.\par \par The patient understands that aerobic exercises must be increased to minutes 4 times/week and a detailed discussion of lifestyle modification was done today. \par The patient has a good understanding of the diagnosis, treatment plan and lifestyle modification. \par She will contact me at the office for any questions with their care or any changes in their health status.\par \par The plan of care was discussed with the patient with supervision physician Dr. Az Snowden and will be carried out as noted above.\par \par Kelsey SOLORIO

## 2022-05-31 NOTE — REVIEW OF SYSTEMS
[Negative] : Heme/Lymph [Dyspnea on exertion] : dyspnea during exertion [SOB] : no shortness of breath [Chest Discomfort] : no chest discomfort [Lower Ext Edema] : no extremity edema [Leg Claudication] : no intermittent leg claudication [Palpitations] : no palpitations [Orthopnea] : no orthopnea [PND] : no PND [Syncope] : no syncope

## 2022-05-31 NOTE — CARDIOLOGY SUMMARY
[___] : [unfilled] [de-identified] : 5/31/22 [de-identified] : 24 hour Holter Monitor [de-identified] : 2/2/2021 [de-identified] : 2/2/2021

## 2022-05-31 NOTE — REASON FOR VISIT
[CV Risk Factors and Non-Cardiac Disease] : CV risk factors and non-cardiac disease [Arrhythmia/ECG Abnorrmalities] : arrhythmia/ECG abnormalities [Structural Heart and Valve Disease] : structural heart and valve disease [Hyperlipidemia] : hyperlipidemia [FreeTextEntry1] : This is a 57 year year old female here today for follow up cardiac evaluation. \par She has a past medical history significant for mitral valve prolapse, Landin's esophagus, nephrolithiasis, psoriasis.\par \par CHIEF COMPLAINT:\par Today she is feeling generally well and does not have any complaints at this time. She is currently prescribed HCTZ 12.5mg PO DAILY by her Nephrologist for management of Kidney stones and was placed on Rosuvastatin 20mg PO DAILY due to Ca score of 7 units. She does have a hx for HLD. She states that she has not been compliant with the Rosuvastatin due to her not feeling well on it (very bloated). She had issues with Rosuvastatin in the past as well and was told to rechallenge herself due to her increased CA score. She was prescribed Repatha but is not taking it at this time. \par \par She states her palpitations have greatly improved and she believes they were being caused by a disc in her back which may have been pressing on her spinal cord. She states that she has been following up with her neurologist closely. \par

## 2022-08-16 ENCOUNTER — TRANSCRIPTION ENCOUNTER (OUTPATIENT)
Age: 57
End: 2022-08-16

## 2022-08-17 ENCOUNTER — TRANSCRIPTION ENCOUNTER (OUTPATIENT)
Age: 57
End: 2022-08-17

## 2022-09-09 ENCOUNTER — APPOINTMENT (OUTPATIENT)
Dept: CARDIOLOGY | Facility: CLINIC | Age: 57
End: 2022-09-09

## 2022-10-04 ENCOUNTER — APPOINTMENT (OUTPATIENT)
Dept: NEPHROLOGY | Facility: CLINIC | Age: 57
End: 2022-10-04

## 2022-10-04 VITALS
BODY MASS INDEX: 28.04 KG/M2 | HEART RATE: 61 BPM | HEIGHT: 68 IN | RESPIRATION RATE: 16 BRPM | DIASTOLIC BLOOD PRESSURE: 86 MMHG | WEIGHT: 185 LBS | SYSTOLIC BLOOD PRESSURE: 125 MMHG

## 2022-10-04 DIAGNOSIS — N28.1 CYST OF KIDNEY, ACQUIRED: ICD-10-CM

## 2022-10-04 PROCEDURE — 99205 OFFICE O/P NEW HI 60 MIN: CPT

## 2022-10-04 NOTE — PHYSICAL EXAM
[General Appearance - Alert] : alert [General Appearance - In No Acute Distress] : in no acute distress [Sclera] : the sclera and conjunctiva were normal [PERRL With Normal Accommodation] : pupils were equal in size, round, and reactive to light [Extraocular Movements] : extraocular movements were intact [Outer Ear] : the ears and nose were normal in appearance [Oropharynx] : the oropharynx was normal [Neck Appearance] : the appearance of the neck was normal [Neck Cervical Mass (___cm)] : no neck mass was observed [Jugular Venous Distention Increased] : there was no jugular-venous distention [Thyroid Diffuse Enlargement] : the thyroid was not enlarged [Thyroid Nodule] : there were no palpable thyroid nodules

## 2022-10-12 ENCOUNTER — TRANSCRIPTION ENCOUNTER (OUTPATIENT)
Age: 57
End: 2022-10-12

## 2022-10-12 LAB
25(OH)D3 SERPL-MCNC: 44.9 NG/ML
ALBUMIN SERPL ELPH-MCNC: 4.8 G/DL
ANION GAP SERPL CALC-SCNC: 12 MMOL/L
APPEARANCE: CLEAR
BACTERIA: NEGATIVE
BASOPHILS # BLD AUTO: 0.05 K/UL
BASOPHILS NFR BLD AUTO: 0.8 %
BILIRUBIN URINE: NEGATIVE
BLOOD URINE: NEGATIVE
BUN SERPL-MCNC: 17 MG/DL
CALCIUM SERPL-MCNC: 10 MG/DL
CALCIUM SERPL-MCNC: 10 MG/DL
CHLORIDE SERPL-SCNC: 104 MMOL/L
CO2 SERPL-SCNC: 26 MMOL/L
COLOR: COLORLESS
CREAT SERPL-MCNC: 0.69 MG/DL
CREAT SPEC-SCNC: 20 MG/DL
CREAT/PROT UR: 0.2 RATIO
EGFR: 102 ML/MIN/1.73M2
EOSINOPHIL # BLD AUTO: 0.04 K/UL
EOSINOPHIL NFR BLD AUTO: 0.7 %
GLUCOSE QUALITATIVE U: NEGATIVE
GLUCOSE SERPL-MCNC: 95 MG/DL
HCT VFR BLD CALC: 41.2 %
HGB BLD-MCNC: 13.3 G/DL
HYALINE CASTS: 0 /LPF
IMM GRANULOCYTES NFR BLD AUTO: 0.2 %
KETONES URINE: NEGATIVE
LEUKOCYTE ESTERASE URINE: ABNORMAL
LYMPHOCYTES # BLD AUTO: 2.09 K/UL
LYMPHOCYTES NFR BLD AUTO: 34 %
MAN DIFF?: NORMAL
MCHC RBC-ENTMCNC: 31.1 PG
MCHC RBC-ENTMCNC: 32.3 GM/DL
MCV RBC AUTO: 96.3 FL
MICROSCOPIC-UA: NORMAL
MONOCYTES # BLD AUTO: 0.4 K/UL
MONOCYTES NFR BLD AUTO: 6.5 %
NEUTROPHILS # BLD AUTO: 3.56 K/UL
NEUTROPHILS NFR BLD AUTO: 57.8 %
NITRITE URINE: NEGATIVE
PARATHYROID HORMONE INTACT: 29 PG/ML
PH URINE: 7
PHOSPHATE SERPL-MCNC: 3.4 MG/DL
PLATELET # BLD AUTO: 247 K/UL
POTASSIUM SERPL-SCNC: 4.6 MMOL/L
PROT UR-MCNC: 4 MG/DL
PROTEIN URINE: NEGATIVE
RBC # BLD: 4.28 M/UL
RBC # FLD: 12.6 %
RED BLOOD CELLS URINE: 1 /HPF
SODIUM SERPL-SCNC: 142 MMOL/L
SPECIFIC GRAVITY URINE: 1.01
SQUAMOUS EPITHELIAL CELLS: 0 /HPF
UROBILINOGEN URINE: NORMAL
VIT A SERPL-MCNC: 40.2 UG/DL
WBC # FLD AUTO: 6.15 K/UL
WHITE BLOOD CELLS URINE: 2 /HPF

## 2022-10-17 ENCOUNTER — RX CHANGE (OUTPATIENT)
Age: 57
End: 2022-10-17

## 2022-10-18 ENCOUNTER — RX CHANGE (OUTPATIENT)
Age: 57
End: 2022-10-18

## 2022-10-19 ENCOUNTER — RX CHANGE (OUTPATIENT)
Age: 57
End: 2022-10-19

## 2022-10-19 ENCOUNTER — TRANSCRIPTION ENCOUNTER (OUTPATIENT)
Age: 57
End: 2022-10-19

## 2022-11-02 ENCOUNTER — APPOINTMENT (OUTPATIENT)
Dept: UROLOGY | Facility: CLINIC | Age: 57
End: 2022-11-02

## 2022-11-21 LAB
ANION GAP SERPL CALC-SCNC: 10 MMOL/L
BUN SERPL-MCNC: 15 MG/DL
CALCIUM SERPL-MCNC: 10.1 MG/DL
CHLORIDE SERPL-SCNC: 102 MMOL/L
CHOLEST SERPL-MCNC: 145 MG/DL
CHOLEST SERPL-MCNC: 145 MG/DL
CO2 SERPL-SCNC: 29 MMOL/L
CREAT SERPL-MCNC: 0.73 MG/DL
EGFR: 97 ML/MIN/1.73M2
GLUCOSE SERPL-MCNC: 71 MG/DL
HDLC SERPL-MCNC: 65 MG/DL
HDLC SERPL-MCNC: 65 MG/DL
LDLC SERPL CALC-MCNC: 62 MG/DL
LDLC SERPL CALC-MCNC: 62 MG/DL
MAGNESIUM SERPL-MCNC: 2.2 MG/DL
NONHDLC SERPL-MCNC: 80 MG/DL
NONHDLC SERPL-MCNC: 80 MG/DL
POTASSIUM SERPL-SCNC: 4.7 MMOL/L
SODIUM SERPL-SCNC: 141 MMOL/L
TRIGL SERPL-MCNC: 88 MG/DL
TRIGL SERPL-MCNC: 88 MG/DL
TSH SERPL-ACNC: 1.36 UIU/ML
URATE SERPL-MCNC: 5.2 MG/DL

## 2022-11-22 ENCOUNTER — APPOINTMENT (OUTPATIENT)
Dept: CARDIOLOGY | Facility: CLINIC | Age: 57
End: 2022-11-22

## 2022-11-22 ENCOUNTER — NON-APPOINTMENT (OUTPATIENT)
Age: 57
End: 2022-11-22

## 2022-11-22 VITALS
SYSTOLIC BLOOD PRESSURE: 126 MMHG | RESPIRATION RATE: 16 BRPM | HEART RATE: 58 BPM | OXYGEN SATURATION: 97 % | BODY MASS INDEX: 27.89 KG/M2 | DIASTOLIC BLOOD PRESSURE: 88 MMHG | TEMPERATURE: 97.7 F | WEIGHT: 184 LBS | HEIGHT: 68 IN

## 2022-11-22 PROCEDURE — 93000 ELECTROCARDIOGRAM COMPLETE: CPT

## 2022-11-22 PROCEDURE — 99214 OFFICE O/P EST MOD 30 MIN: CPT | Mod: 25

## 2022-11-22 RX ORDER — HYDROCHLOROTHIAZIDE 12.5 MG/1
12.5 TABLET ORAL
Qty: 90 | Refills: 1 | Status: DISCONTINUED | COMMUNITY
Start: 2021-05-19 | End: 2022-11-22

## 2022-11-22 NOTE — REVIEW OF SYSTEMS
[Dyspnea on exertion] : dyspnea during exertion [Negative] : Heme/Lymph [SOB] : no shortness of breath [Chest Discomfort] : no chest discomfort [Lower Ext Edema] : no extremity edema [Leg Claudication] : no intermittent leg claudication [Palpitations] : palpitations [Orthopnea] : no orthopnea [PND] : no PND [Syncope] : no syncope

## 2022-11-22 NOTE — ASSESSMENT
[FreeTextEntry1] : This is a 57 year year old female here today for follow up cardiac evaluation. \par She has a past medical history significant for mitral valve prolapse, Landin's esophagus, nephrolithiasis, psoriasis.\par \par CHIEF COMPLAINT:\par Today she is feeling generally well and does not have any complaints at this time.  She states that she is no longer on hydrochlorothiazide as per her nephrologist.  She would like to make a note that she has been experiencing frequent palpitations as of lately that have also been waking her up from her sleep.  She did have a CPAP machine at one point however has not used this for the past 2 months.  She denies any chest pain.  She may have occasional dyspnea on exertion when going up some steps.  \par \par She is planning on following up with a neurologist later on this month.\par \par -She states she has been trying to work on better diet and exercise to help with her cholesterol although she has a history for FH.\par \par -She does not drink excessive caffeine or alcohol.  She has no history of rheumatic fever.  She has been under a lot of stress recently related to the loss of her .  Her cardiac risk factors include hypercholesterolemia.\par \par BLOOD PRESSURE:\par -BP is well controlled in today's visit.\par \par -I have discussed the importance of maintaining good BP control and reviewed the newest guidelines with the patient while re-enforcing dietary sodium restrictions to no more than 2-3 g daily, DASH diet, life style modifications as well as the goal of maintaining ideal body weight with the patient today. I have advised the patient to avoid the use of over-the-counter medications/ supplements especially NSAIDS.\par \par I have reviewed with Ms. PAGAN that serious health consequences can occur when blood pressure is not well controlled and the need for strict compliance with medication and that optimal control can significantly reduce the risk of cardiovascular disease stroke, heart attack and other organ damage. They verbally expressed understanding of the fore mentioned serious health consequences to me today.\par \par BLOOD WORK:\par -Recent blood work done 2022 demonstrated normal lab values.\par -Blood work done at [facility] doctor's office demonstrated cholesterol of 223 and LDL of 186\par \par PMH:\par She was placed on Rosuvastatin 20mg PO DAILY due to Ca score of 7 units. She does have a hx for HLD. She states that she has not been compliant with the Rosuvastatin due to her not feeling well on it (very bloated). She had issues with Rosuvastatin in the past as well and was told to rechallenge herself due to her increased CA score. She was prescribed Repatha but is not taking it at this time. \par \par -She has a history of FH and is concerned about her risk for CAD/blockage since her   from an MI.  \par \par CHOLESTEROL CONTROL:\par -Patient will continue the advised  TLC diet and to continue follow-up for treatment of hyperlipidemia and repeat blood testing with diet and exercise. I have discussed different exercises and the importance of maintenance of optimal body weight. The importance of staying within guidelines and recommendations was stressed to the patient today and they acknowledged that they understand this to me verbally.\par \par TESTING/REPORTS:\par -EKG done 2022 which demonstrated regular sinus rhythm with nonspecific ST-T wave changes BPM of \par 58.\par \par -EKG done 2022 which demonstrated regular sinus rhythm with nonspecific ST-T wave changes BPM of 63. \par \par -Ca score done 2021 demonstrated 7 Units.\par \par -EKG done Oct 11, 2021 which demonstrated regular sinus rhythm with nonspecific ST-T wave changes, BPM of 76.\par \par -The patient had normal exercise stress test 2021.\par \par -Echo Doppler examination done 2021 demonstrated minimal to mild mitral and tricuspid valve regurgitation, physiologic pulmonic valve regurgitation, normal left ventricular function, and estimated ejection fraction 68%.\par \par PMH:\par She states her palpitations have greatly improved and she believes they were being caused by a disc in her back which may have been pressing on her spinal cord. She states that she has been following up with her neurologist closely. \par \par PLAN:\par -24 hour holter monitor to evaluate her palpitations.\par -She will continue with her usual medications and will contact the office if she is having any complaints between now and their next follow up appointment.\par -She will follow up with her nephrologist and neurologist\par -The patient will schedule an Exercise Stress Test rule out significant coronary artery disease.\par -The patient will schedule an Echo Doppler examination to evaluate murmur, left ventricular function, chamber size, and rule out hypertrophy. \par \par I have discussed the plan of care with Ms. JOSSUE PAGAN  and she  will follow up in 3 months. She is compliant with all of her medications.\par \par The patient understands that aerobic exercises must be increased to minutes 4 times/week and a detailed discussion of lifestyle modification was done today. \par The patient has a good understanding of the diagnosis, treatment plan and lifestyle modification. \par She will contact me at the office for any questions with their care or any changes in their health status.\par \par The plan of care was discussed with the patient with supervision physician Dr. Az Snowden and will be carried out as noted above.\par \par Kelsey SOLORIO

## 2022-11-22 NOTE — PHYSICAL EXAM
[Well Developed] : well developed [Well Nourished] : well nourished [No Acute Distress] : no acute distress [Normal Venous Pressure] : normal venous pressure [No Carotid Bruit] : no carotid bruit [Normal S1, S2] : normal S1, S2 [No Rub] : no rub [Murmur] : murmur [Clear Lung Fields] : clear lung fields [Good Air Entry] : good air entry [No Respiratory Distress] : no respiratory distress  [Soft] : abdomen soft [Non Tender] : non-tender [No Masses/organomegaly] : no masses/organomegaly [Normal Bowel Sounds] : normal bowel sounds [Normal Gait] : normal gait [No Edema] : no edema [No Cyanosis] : no cyanosis [No Clubbing] : no clubbing [No Varicosities] : no varicosities [No Rash] : no rash [No Skin Lesions] : no skin lesions [Moves all extremities] : moves all extremities [No Focal Deficits] : no focal deficits [Normal Speech] : normal speech [Alert and Oriented] : alert and oriented [Normal memory] : normal memory [General Appearance - Well Developed] : well developed [Normal Appearance] : normal appearance [General Appearance - Well Nourished] : well nourished [Normal Conjunctiva] : the conjunctiva exhibited no abnormalities [Normal Oral Mucosa] : normal oral mucosa [No Oral Pallor] : no oral pallor [Normal Oropharynx] : normal oropharynx [Normal Jugular Venous V Waves Present] : normal jugular venous V waves present [Respiration, Rhythm And Depth] : normal respiratory rhythm and effort [Exaggerated Use Of Accessory Muscles For Inspiration] : no accessory muscle use [Auscultation Breath Sounds / Voice Sounds] : lungs were clear to auscultation bilaterally [Bowel Sounds] : normal bowel sounds [Abdomen Soft] : soft [Abnormal Walk] : normal gait [Nail Clubbing] : no clubbing of the fingernails [Cyanosis, Localized] : no localized cyanosis [Skin Turgor] : normal skin turgor [] : no rash [Oriented To Time, Place, And Person] : oriented to person, place, and time [Impaired Insight] : insight and judgment were intact [Affect] : the affect was normal [5th Left ICS - MCL] : palpated at the 5th LICS in the midclavicular line [Normal] : normal [No Precordial Heave] : no precordial heave was noted [Normal Rate] : normal [Rhythm Regular] : regular [Normal S1] : normal S1 [Normal S2] : normal S2 [No Gallop] : no gallop heard [I] : a grade 1 [2+] : left 2+ [No Abnormalities] : the abdominal aorta was not enlarged and no bruit was heard [No Pitting Edema] : no pitting edema present [Apical Thrill] : no thrill palpable at the apex [Right Carotid Bruit] : no bruit heard over the right carotid [Left Carotid Bruit] : no bruit heard over the left carotid [Right Femoral Bruit] : no bruit heard over the right femoral artery [Left Femoral Bruit] : no bruit heard over the left femoral artery [S3] : no S3 [S4] : no S4 [Click] : no click [Distant] : the heart sounds were ~L not distant [Pericardial Rub] : no pericardial rub

## 2022-11-22 NOTE — REASON FOR VISIT
[CV Risk Factors and Non-Cardiac Disease] : CV risk factors and non-cardiac disease [Arrhythmia/ECG Abnorrmalities] : arrhythmia/ECG abnormalities [Structural Heart and Valve Disease] : structural heart and valve disease [Hyperlipidemia] : hyperlipidemia [FreeTextEntry1] : This is a 57 year year old female here today for follow up cardiac evaluation. \par She has a past medical history significant for mitral valve prolapse, Landin's esophagus, nephrolithiasis, psoriasis.\par \par CHIEF COMPLAINT:\par Today she is feeling generally well and does not have any complaints at this time.  She states that she is no longer on hydrochlorothiazide as per her nephrologist.  She would like to make a note that she has been experiencing frequent palpitations as of lately that have also been waking her up from her sleep.  She did have a CPAP machine at one point however has not used this for the past 2 months.  She denies any chest pain.  She may have occasional dyspnea on exertion when going up some steps.  \par \par She is planning on following up with a neurologist later on this month.\par \par She states her palpitations have greatly improved and she believes they were being caused by a disc in her back which may have been pressing on her spinal cord. She states that she has been following up with her neurologist closely. \par

## 2022-11-22 NOTE — CARDIOLOGY SUMMARY
[___] : [unfilled] [de-identified] : 5/31/22 [de-identified] : 24 hour Holter Monitor [de-identified] : 2/2/2021 [de-identified] : 2/2/2021

## 2022-11-28 RX ORDER — BIOTIN 10 MG
TABLET ORAL
Refills: 0 | Status: COMPLETED | COMMUNITY
End: 2022-11-28

## 2022-11-30 ENCOUNTER — APPOINTMENT (OUTPATIENT)
Dept: CARDIOLOGY | Facility: CLINIC | Age: 57
End: 2022-11-30

## 2022-12-07 NOTE — ED ADULT NURSE NOTE - NS ED NURSE LEVEL OF CONSCIOUSNESS ORIENTATION
Length To Time In Minutes Device Was In Place: 10 Oriented - self; Oriented - place; Oriented - time

## 2022-12-08 ENCOUNTER — APPOINTMENT (OUTPATIENT)
Dept: GASTROENTEROLOGY | Facility: CLINIC | Age: 57
End: 2022-12-08

## 2022-12-11 NOTE — REASON FOR VISIT
Problem: Knowledge Deficit - L&D  Goal: Patient and family/caregivers will demonstrate understanding of plan of care, disease process/condition, diagnostic tests and medications  Outcome: Progressing     Problem: Risk for Excess Fluid Volume  Goal: Patient will demonstrate pulse, blood pressure and neurologic signs within expected ranges and without any respiratory complications  Outcome: Progressing   The patient is Stable - Low risk of patient condition declining or worsening              [FreeTextEntry1] : This is a 56 year year old female here today for follow up cardiac evaluation. \par She has a past medical history significant for  mitral valve prolapse, Landin's esophagus, nephrolithiasis, psoriasis\par \par She called earlier this week stating that she did not feel well. Phone note from March 31, 2021:\par \par Ms. JOSSUE PAGAN was called and spoken to on the phone about how she is feeling and she states that she is waking up in the middle of the night with trouble breathing like she has to take a deep breath and walk around to catch her breath. She does have a pulmonologist Dr. Holt in [place] who she will also be following up with for management of her sleep apnea. I explained that she should go to the ER if she is not feeling well but at this time she does not wish to do so. We will move her appointment up sooner to this Friday and she will contact her pulmonologist in the meantime to asses if there is any need for PFT or CPAP adjustments. \par \par Today she is still complaining of having difficulty breathing in the middle of the night and states that she was recently told that she had an infection in her urine. She is also complaining of heart palpitations/buzzing feeling in her chest at times. These issues have been going on and off for approx. the last year. She has not been on Rosuvastatin due to having muscle aches and believes that she can not tolerate the statins. Based off her previous blood work she does appear to have FH. She states that she also feels very fatigued when she wakes up in the morning/does not feel refreshed and that her legs fell heavy. \par \par \par

## 2022-12-29 ENCOUNTER — NON-APPOINTMENT (OUTPATIENT)
Age: 57
End: 2022-12-29

## 2022-12-29 RX ORDER — BEMPEDOIC ACID AND EZETIMIBE 180; 10 MG/1; MG/1
180-10 TABLET, FILM COATED ORAL
Qty: 90 | Refills: 1 | Status: DISCONTINUED | COMMUNITY
Start: 2022-05-31 | End: 2022-12-29

## 2023-01-04 ENCOUNTER — TRANSCRIPTION ENCOUNTER (OUTPATIENT)
Age: 58
End: 2023-01-04

## 2023-01-20 ENCOUNTER — TRANSCRIPTION ENCOUNTER (OUTPATIENT)
Age: 58
End: 2023-01-20

## 2023-01-30 NOTE — ED ADULT NURSE NOTE - NS ED NURSE DISCH DISPOSITION
Discharged 5-Fu Pregnancy And Lactation Text: This medication is Pregnancy Category X and contraindicated in pregnancy and in women who may become pregnant. It is unknown if this medication is excreted in breast milk.

## 2023-02-13 ENCOUNTER — APPOINTMENT (OUTPATIENT)
Dept: CARDIOLOGY | Facility: CLINIC | Age: 58
End: 2023-02-13
Payer: MEDICARE

## 2023-02-13 VITALS
WEIGHT: 185 LBS | HEART RATE: 72 BPM | SYSTOLIC BLOOD PRESSURE: 121 MMHG | BODY MASS INDEX: 28.04 KG/M2 | RESPIRATION RATE: 16 BRPM | TEMPERATURE: 98.1 F | DIASTOLIC BLOOD PRESSURE: 85 MMHG | OXYGEN SATURATION: 96 % | HEIGHT: 68 IN

## 2023-02-13 DIAGNOSIS — R06.09 OTHER FORMS OF DYSPNEA: ICD-10-CM

## 2023-02-13 DIAGNOSIS — I65.29 OCCLUSION AND STENOSIS OF UNSPECIFIED CAROTID ARTERY: ICD-10-CM

## 2023-02-13 DIAGNOSIS — R00.2 PALPITATIONS: ICD-10-CM

## 2023-02-13 DIAGNOSIS — I25.10 ATHEROSCLEROTIC HEART DISEASE OF NATIVE CORONARY ARTERY W/OUT ANGINA PECTORIS: ICD-10-CM

## 2023-02-13 PROCEDURE — 93306 TTE W/DOPPLER COMPLETE: CPT

## 2023-02-13 PROCEDURE — 99213 OFFICE O/P EST LOW 20 MIN: CPT | Mod: 25

## 2023-02-13 PROCEDURE — 93015 CV STRESS TEST SUPVJ I&R: CPT

## 2023-02-14 ENCOUNTER — APPOINTMENT (OUTPATIENT)
Dept: CARDIOLOGY | Facility: CLINIC | Age: 58
End: 2023-02-14

## 2023-02-14 NOTE — REASON FOR VISIT
[CV Risk Factors and Non-Cardiac Disease] : CV risk factors and non-cardiac disease [Arrhythmia/ECG Abnorrmalities] : arrhythmia/ECG abnormalities [Structural Heart and Valve Disease] : structural heart and valve disease [Hyperlipidemia] : hyperlipidemia [FreeTextEntry1] : This is a 57 year year old female here today for follow up cardiac evaluation. \par She has a past medical history significant for mitral valve prolapse, Landin's esophagus, nephrolithiasis, psoriasis, COVID-19 (2/2022) and FH.\par \par Patient has stopped Nexlizet 180/10mg since November 2022 after she started having severe achilles tendon pain. Patient is generally doing well but does report occasional dyspnea on exertion, palpitations and SOB at night. She did have a CPAP machine at one point however has not used this for the past few months. She is still experiencing tendon pain in foot/achilles and cramps so she has been working closely with rheumatologist. She states that she has been following up with her neurologist and nephrologist also. Patient is dealing with multiple health concern, thyroid nodules, kidney stones, wants to sort that out before going back on cholesterol medications. She also wants to work with registered dietitian for a few months.\par \par Last blood work 11/2022 when patient was on Nexlizet: Cholesterol 145, Triglycerides 88, LDL 62, HDL 65\par CRF: familial hypercholesterolemia

## 2023-02-14 NOTE — REVIEW OF SYSTEMS
[Dyspnea on exertion] : dyspnea during exertion [Palpitations] : palpitations [Negative] : Heme/Lymph [SOB] : no shortness of breath [Chest Discomfort] : no chest discomfort [Lower Ext Edema] : no extremity edema [Leg Claudication] : no intermittent leg claudication [Orthopnea] : no orthopnea [PND] : no PND [Syncope] : no syncope

## 2023-02-14 NOTE — PHYSICAL EXAM
[Well Developed] : well developed [Well Nourished] : well nourished [No Acute Distress] : no acute distress [Normal Venous Pressure] : normal venous pressure [No Carotid Bruit] : no carotid bruit [Normal S1, S2] : normal S1, S2 [No Rub] : no rub [Murmur] : murmur [Clear Lung Fields] : clear lung fields [Good Air Entry] : good air entry [No Respiratory Distress] : no respiratory distress  [Soft] : abdomen soft [Non Tender] : non-tender [No Masses/organomegaly] : no masses/organomegaly [Normal Bowel Sounds] : normal bowel sounds [Normal Gait] : normal gait [No Edema] : no edema [No Cyanosis] : no cyanosis [No Clubbing] : no clubbing [No Varicosities] : no varicosities [No Rash] : no rash [No Skin Lesions] : no skin lesions [Moves all extremities] : moves all extremities [No Focal Deficits] : no focal deficits [Normal Speech] : normal speech [Alert and Oriented] : alert and oriented [Normal memory] : normal memory [General Appearance - Well Developed] : well developed [Normal Appearance] : normal appearance [General Appearance - Well Nourished] : well nourished [Normal Conjunctiva] : the conjunctiva exhibited no abnormalities [Normal Oral Mucosa] : normal oral mucosa [No Oral Pallor] : no oral pallor [Normal Oropharynx] : normal oropharynx [Normal Jugular Venous V Waves Present] : normal jugular venous V waves present [Respiration, Rhythm And Depth] : normal respiratory rhythm and effort [Exaggerated Use Of Accessory Muscles For Inspiration] : no accessory muscle use [Auscultation Breath Sounds / Voice Sounds] : lungs were clear to auscultation bilaterally [Bowel Sounds] : normal bowel sounds [Abdomen Soft] : soft [Abnormal Walk] : normal gait [Nail Clubbing] : no clubbing of the fingernails [Cyanosis, Localized] : no localized cyanosis [Skin Turgor] : normal skin turgor [] : no rash [Oriented To Time, Place, And Person] : oriented to person, place, and time [Impaired Insight] : insight and judgment were intact [Affect] : the affect was normal [5th Left ICS - MCL] : palpated at the 5th LICS in the midclavicular line [Normal] : normal [No Precordial Heave] : no precordial heave was noted [Normal Rate] : normal [Rhythm Regular] : regular [Normal S1] : normal S1 [Normal S2] : normal S2 [No Gallop] : no gallop heard [I] : a grade 1 [No Abnormalities] : the abdominal aorta was not enlarged and no bruit was heard [No Pitting Edema] : no pitting edema present [2+] : left 2+ [Apical Thrill] : no thrill palpable at the apex [Right Carotid Bruit] : no bruit heard over the right carotid [Left Carotid Bruit] : no bruit heard over the left carotid [Right Femoral Bruit] : no bruit heard over the right femoral artery [Left Femoral Bruit] : no bruit heard over the left femoral artery [S3] : no S3 [S4] : no S4 [Click] : no click [Pericardial Rub] : no pericardial rub

## 2023-02-14 NOTE — ASSESSMENT
[FreeTextEntry1] : Prior note nurse practitioner Tara 2022::\par \par This is a 57 year year old female here today for follow up cardiac evaluation. \par She has a past medical history significant for mitral valve prolapse, Landin's esophagus, nephrolithiasis, psoriasis.\par \par CHIEF COMPLAINT:\par Today she is feeling generally well and does not have any complaints at this time.  She states that she is no longer on hydrochlorothiazide as per her nephrologist.  She would like to make a note that she has been experiencing frequent palpitations as of lately that have also been waking her up from her sleep.  She did have a CPAP machine at one point however has not used this for the past 2 months.  She denies any chest pain.  She may have occasional dyspnea on exertion when going up some steps.  \par \par She is planning on following up with a neurologist later on this month.\par \par -She states she has been trying to work on better diet and exercise to help with her cholesterol although she has a history for FH.\par \par -She does not drink excessive caffeine or alcohol.  She has no history of rheumatic fever.  She has been under a lot of stress recently related to the loss of her .  Her cardiac risk factors include hypercholesterolemia.\par \par BLOOD PRESSURE:\par -BP is well controlled in today's visit.\par \par -I have discussed the importance of maintaining good BP control and reviewed the newest guidelines with the patient while re-enforcing dietary sodium restrictions to no more than 2-3 g daily, DASH diet, life style modifications as well as the goal of maintaining ideal body weight with the patient today. I have advised the patient to avoid the use of over-the-counter medications/ supplements especially NSAIDS.\par \par I have reviewed with Ms. PAGAN that serious health consequences can occur when blood pressure is not well controlled and the need for strict compliance with medication and that optimal control can significantly reduce the risk of cardiovascular disease stroke, heart attack and other organ damage. They verbally expressed understanding of the fore mentioned serious health consequences to me today.\par \par BLOOD WORK:\par -Recent blood work done 2022 demonstrated normal lab values.\par -Blood work done at [facility] doctor's office demonstrated cholesterol of 223 and LDL of 186\par \par PMH:\par She was placed on Rosuvastatin 20mg PO DAILY due to Ca score of 7 units. She does have a hx for HLD. She states that she has not been compliant with the Rosuvastatin due to her not feeling well on it (very bloated). She had issues with Rosuvastatin in the past as well and was told to rechallenge herself due to her increased CA score. She was prescribed Repatha but is not taking it at this time. \par \par -She has a history of FH and is concerned about her risk for CAD/blockage since her   from an MI.  \par \par CHOLESTEROL CONTROL:\par -Patient will continue the advised  TLC diet and to continue follow-up for treatment of hyperlipidemia and repeat blood testing with diet and exercise. I have discussed different exercises and the importance of maintenance of optimal body weight. The importance of staying within guidelines and recommendations was stressed to the patient today and they acknowledged that they understand this to me verbally.\par \par TESTING/REPORTS:\par -EKG done 2022 which demonstrated regular sinus rhythm with nonspecific ST-T wave changes BPM of \par 58.\par \par -EKG done 2022 which demonstrated regular sinus rhythm with nonspecific ST-T wave changes BPM of 63. \par \par -Ca score done 2021 demonstrated 7 Units.\par \par -EKG done Oct 11, 2021 which demonstrated regular sinus rhythm with nonspecific ST-T wave changes, BPM of 76.\par \par -The patient had normal exercise stress test 2021.\par \par -Echo Doppler examination done 2021 demonstrated minimal to mild mitral and tricuspid valve regurgitation, physiologic pulmonic valve regurgitation, normal left ventricular function, and estimated ejection fraction 68%.\par \par PMH:\par She states her palpitations have greatly improved and she believes they were being caused by a disc in her back which may have been pressing on her spinal cord. She states that she has been following up with her neurologist closely. \par \par PLAN:\par -24 hour holter monitor to evaluate her palpitations.\par -She will continue with her usual medications and will contact the office if she is having any complaints between now and their next follow up appointment.\par -She will follow up with her nephrologist and neurologist\par -The patient will schedule an Exercise Stress Test rule out significant coronary artery disease.\par -The patient will schedule an Echo Doppler examination to evaluate murmur, left ventricular function, chamber size, and rule out hypertrophy. \par \par I have discussed the plan of care with Ms. JOSSUE PAGAN  and she  will follow up in 3 months. She is compliant with all of her medications.\par \par The patient understands that aerobic exercises must be increased to minutes 4 times/week and a detailed discussion of lifestyle modification was done today. \par The patient has a good understanding of the diagnosis, treatment plan and lifestyle modification. \par She will contact me at the office for any questions with their care or any changes in their health status.\par \par The plan of care was discussed with the patient with supervision physician Dr. Az Snowden and will be carried out as noted above.\par \par Kelsey SOLORIO

## 2023-02-14 NOTE — CARDIOLOGY SUMMARY
[___] : [unfilled] [de-identified] : 5/31/22 [de-identified] : 24 hour Holter Monitor [de-identified] : 2/2/2021 [de-identified] : 2/2/2021

## 2023-02-14 NOTE — DISCUSSION/SUMMARY
[FreeTextEntry1] : This is a 57-year-old female with past medical history significant mitral valve prolapse, heterozygous familial hypercholesterolemia.  Status post COVID-19 in February 2022, dyspepsia, Landin's esophagus, nephrolithiasis, psoriasis, who comes in for cardiac follow-up evaluation.\par She denies chest pain, shortness of breath, dizziness or syncope.\par The patient had a normal exercise stress test February 13, 2023.\par She will have new blood work done for electrolytes and lipid panel.\par She discontinued her cholesterol medicine because she found she had thyroid nodules.  I have explained to her that she will need lipid-lowering therapy.\par Further recommendations will made after results of today's blood work available for my review.\par She is also concerned about her right carotid artery, because when she is laying flat on her right side she feels intense pulsations of her right carotid artery she is concerned that she has carotid artery disease with "blood pooling in her neck".  I have tried to reassure her.  She will schedule carotid Doppler examination to rule out carotid artery disease.\par She instructed follow-up with her primary care physician as well.\par Blood work done November 19, 2022 demonstrated cholesterol of 145, HDL 65, triglycerides 88, LDL 62 and non-HDL cholesterol 80 mg/dL.\par Coronary artery calcium score done December 2, 2021 was 7, with all 7 units in left anterior descending artery.\par The patient discontinued Crestor 20 mg daily on her own March 3, 2021 due to feelings of fatigue and backaches.  She stopped the medication and "felt well".\par The patient will continue to work on her diet and exercise program, however if her LDL is consistent with heterozygous familial hypercholesterolemia, statin therapy to reduce LDL to a target of less than 100 mg/dL is appropriate.\par \par Echo Doppler examination done February 2, 2021 demonstrated minimal to mild mitral and tricuspid valve regurgitation, physiologic pulmonic valve regurgitation, normal left ventricular function, and estimated ejection fraction 68%.\par \par The patient had elevated LDL cholesterol, consistent with heterozygous hypercholesterolemia, and was started on Crestor therapy, and the patient is currently taking no lipid-lowering therapy.\par The patient had a lipid panel done April 12, 2021 which demonstrated total cholesterol 236, calculated  mg/dL, non-HDL cholesterol 180 mg/dL and HDL of 56 mg/dL, and direct LDL cholesterol 161 mg/dL.\par She is currently hemodynamically stable and asymptomatic from a cardiac standpoint.\par \par The patient had normal exercise stress test February 2, 2021.\par She will have an echo Doppler examination February 2, 2021 to rule out mitral valve prolapse.\par Electrocardiogram done January 12, 2021 demonstrated normal sinus rhythm rate 64 bpm and is otherwise unremarkable.\par \par Patient had blood work done January 4, 2021 which demonstrated a total cholesterol 276 mg/dL, HDL 69 mg/dL, calculated LDL cholesterol 190 mg/dL.\par These findings are consistent with heterozygous familial hypercholesterolemia.\par She is also concerned about her children, given her 's sudden death felt to be related to myocardial infarction.  \par \par She has been complaining of a buzzing sensation in her chest since December 2020.  She denies a specific rapid heartbeat palpitation, or skipped heartbeat.\par \par She does not drink excessive caffeine or alcohol.  She has no history of rheumatic fever.  She has been under a lot of stress recently related to the loss of her .  Her cardiac risk factors include hypercholesterolemia.\par \par Blood work-January 4, 2021 demonstrated a calculated LDL cholesterol 190 mg/dL, total cholesterol 276 mg/dL, HDL 69 mg/dL, triglycerides 86 mg/dL and are consistent with familial heterozygous hypercholesterolemia.  \par \par I have also asked her to make an appointment with our registered dietitian.  \par \par The patient understands that aerobic exercises must be increased to 40 minutes 4 times per week. A detailed discussion of lifestyle modification was done today. The patient has a good understanding of the diagnosis, and treatment plan. Lifestyle modification was also outlined.

## 2023-02-16 ENCOUNTER — TRANSCRIPTION ENCOUNTER (OUTPATIENT)
Age: 58
End: 2023-02-16

## 2023-02-20 RX ORDER — DEXLANSOPRAZOLE 60 MG/1
60 CAPSULE, DELAYED RELEASE ORAL
Refills: 0 | Status: COMPLETED | COMMUNITY
End: 2023-02-20

## 2023-02-20 RX ORDER — CHROMIUM 200 MCG
TABLET ORAL
Refills: 0 | Status: COMPLETED | COMMUNITY
End: 2023-02-20

## 2023-02-20 RX ORDER — PNV NO.95/FERROUS FUM/FOLIC AC 28MG-0.8MG
TABLET ORAL
Refills: 0 | Status: COMPLETED | COMMUNITY
End: 2023-02-20

## 2023-02-27 ENCOUNTER — APPOINTMENT (OUTPATIENT)
Dept: CARDIOLOGY | Facility: CLINIC | Age: 58
End: 2023-02-27

## 2023-02-28 NOTE — ED PROVIDER NOTE - ATTESTATION, MLM
Detail Level: Simple Detail Level: Zone I have reviewed and confirmed nurses' notes for patient's medications, allergies, medical history, and surgical history.

## 2023-02-28 NOTE — ED ADULT NURSE NOTE - PAIN: PRESENCE, MLM
complains of pain/discomfort Rhofade Counseling: Rhofade is a topical medication which can decrease superficial blood flow where applied. Side effects are uncommon and include stinging, redness and allergic reactions.

## 2023-03-14 ENCOUNTER — APPOINTMENT (OUTPATIENT)
Dept: ORTHOPEDIC SURGERY | Facility: CLINIC | Age: 58
End: 2023-03-14
Payer: MEDICARE

## 2023-03-14 VITALS
OXYGEN SATURATION: 98 % | HEIGHT: 68 IN | DIASTOLIC BLOOD PRESSURE: 80 MMHG | HEART RATE: 70 BPM | BODY MASS INDEX: 27.74 KG/M2 | WEIGHT: 183 LBS | SYSTOLIC BLOOD PRESSURE: 116 MMHG | TEMPERATURE: 98 F

## 2023-03-14 DIAGNOSIS — M25.572 PAIN IN RIGHT ANKLE AND JOINTS OF RIGHT FOOT: ICD-10-CM

## 2023-03-14 DIAGNOSIS — M24.9 JOINT DERANGEMENT, UNSPECIFIED: ICD-10-CM

## 2023-03-14 DIAGNOSIS — M21.6X2 OTHER ACQUIRED DEFORMITIES OF LEFT FOOT: ICD-10-CM

## 2023-03-14 DIAGNOSIS — M67.88 OTHER SPECIFIED DISORDERS OF SYNOVIUM AND TENDON, OTHER SITE: ICD-10-CM

## 2023-03-14 DIAGNOSIS — M21.6X1 OTHER ACQUIRED DEFORMITIES OF RIGHT FOOT: ICD-10-CM

## 2023-03-14 DIAGNOSIS — M76.822 POSTERIOR TIBIAL TENDINITIS, LEFT LEG: ICD-10-CM

## 2023-03-14 DIAGNOSIS — M79.671 PAIN IN RIGHT FOOT: ICD-10-CM

## 2023-03-14 DIAGNOSIS — M77.8 OTHER ENTHESOPATHIES, NOT ELSEWHERE CLASSIFIED: ICD-10-CM

## 2023-03-14 DIAGNOSIS — M76.821 POSTERIOR TIBIAL TENDINITIS, RIGHT LEG: ICD-10-CM

## 2023-03-14 DIAGNOSIS — M25.572 PAIN IN LEFT ANKLE AND JOINTS OF LEFT FOOT: ICD-10-CM

## 2023-03-14 DIAGNOSIS — M79.672 PAIN IN RIGHT FOOT: ICD-10-CM

## 2023-03-14 DIAGNOSIS — M25.571 PAIN IN RIGHT ANKLE AND JOINTS OF RIGHT FOOT: ICD-10-CM

## 2023-03-14 DIAGNOSIS — M62.89 OTHER SPECIFIED DISORDERS OF MUSCLE: ICD-10-CM

## 2023-03-14 PROCEDURE — 73630 X-RAY EXAM OF FOOT: CPT | Mod: RT

## 2023-03-14 PROCEDURE — 99213 OFFICE O/P EST LOW 20 MIN: CPT

## 2023-03-14 PROCEDURE — 73600 X-RAY EXAM OF ANKLE: CPT | Mod: LT

## 2023-03-14 PROCEDURE — 99203 OFFICE O/P NEW LOW 30 MIN: CPT

## 2023-03-17 ENCOUNTER — TRANSCRIPTION ENCOUNTER (OUTPATIENT)
Age: 58
End: 2023-03-17

## 2023-03-19 PROBLEM — M76.821 POSTERIOR TIBIAL TENDON DYSFUNCTION, RIGHT: Status: ACTIVE | Noted: 2023-03-19

## 2023-03-19 PROBLEM — M67.88 ACHILLES TENDINOSIS OF LEFT LOWER EXTREMITY: Status: ACTIVE | Noted: 2023-03-19

## 2023-03-19 PROBLEM — R00.2 PALPITATIONS: Status: ACTIVE | Noted: 2021-01-12

## 2023-03-19 PROBLEM — M76.822 POSTERIOR TIBIAL TENDON DYSFUNCTION, LEFT: Status: ACTIVE | Noted: 2023-03-19

## 2023-03-19 PROBLEM — I25.10 CORONARY ARTERY DISEASE: Status: ACTIVE | Noted: 2021-12-08

## 2023-03-19 PROBLEM — R06.09 DYSPNEA ON EXERTION: Status: ACTIVE | Noted: 2021-01-12

## 2023-03-19 PROBLEM — M67.88 ACHILLES TENDINOSIS OF RIGHT LOWER EXTREMITY: Status: ACTIVE | Noted: 2023-03-19

## 2023-03-19 PROBLEM — M77.8 EXTENSOR TENDINITIS OF FOOT: Status: ACTIVE | Noted: 2023-03-19

## 2023-03-19 PROBLEM — M24.9 DERANGEMENT OF ANKLE, RIGHT: Status: ACTIVE | Noted: 2023-03-19

## 2023-03-19 PROBLEM — M62.89 TIGHTNESS OF BOTH GASTROCNEMIUS MUSCLES: Status: ACTIVE | Noted: 2023-03-19

## 2023-03-22 ENCOUNTER — TRANSCRIPTION ENCOUNTER (OUTPATIENT)
Age: 58
End: 2023-03-22

## 2023-03-23 ENCOUNTER — TRANSCRIPTION ENCOUNTER (OUTPATIENT)
Age: 58
End: 2023-03-23

## 2023-03-23 ENCOUNTER — NON-APPOINTMENT (OUTPATIENT)
Age: 58
End: 2023-03-23

## 2023-03-28 ENCOUNTER — TRANSCRIPTION ENCOUNTER (OUTPATIENT)
Age: 58
End: 2023-03-28

## 2023-04-05 ENCOUNTER — APPOINTMENT (OUTPATIENT)
Dept: NEPHROLOGY | Facility: CLINIC | Age: 58
End: 2023-04-05
Payer: MEDICARE

## 2023-04-05 PROCEDURE — 99215 OFFICE O/P EST HI 40 MIN: CPT | Mod: 95

## 2023-04-05 NOTE — REASON FOR VISIT
[Follow-Up] : a follow-up visit [Home] : at home, [unfilled] , at the time of the visit. [Medical Office: ([facility] [place])___] : at the medical office located in  [Patient] : the patient [Self] : self

## 2023-04-05 NOTE — PHYSICAL EXAM
[General Appearance - Alert] : alert [General Appearance - In No Acute Distress] : in no acute distress [Sclera] : the sclera and conjunctiva were normal [PERRL With Normal Accommodation] : pupils were equal in size, round, and reactive to light [Extraocular Movements] : extraocular movements were intact [] : no respiratory distress [Exaggerated Use Of Accessory Muscles For Inspiration] : no accessory muscle use [Oriented To Time, Place, And Person] : oriented to person, place, and time [Impaired Insight] : insight and judgment were intact [Affect] : the affect was normal

## 2023-04-07 ENCOUNTER — TRANSCRIPTION ENCOUNTER (OUTPATIENT)
Age: 58
End: 2023-04-07

## 2023-04-26 ENCOUNTER — APPOINTMENT (OUTPATIENT)
Dept: CARDIOLOGY | Facility: CLINIC | Age: 58
End: 2023-04-26

## 2023-05-01 ENCOUNTER — TRANSCRIPTION ENCOUNTER (OUTPATIENT)
Age: 58
End: 2023-05-01

## 2023-05-17 ENCOUNTER — NON-APPOINTMENT (OUTPATIENT)
Age: 58
End: 2023-05-17

## 2023-05-17 ENCOUNTER — TRANSCRIPTION ENCOUNTER (OUTPATIENT)
Age: 58
End: 2023-05-17

## 2023-05-18 ENCOUNTER — APPOINTMENT (OUTPATIENT)
Dept: ULTRASOUND IMAGING | Facility: IMAGING CENTER | Age: 58
End: 2023-05-18

## 2023-05-18 ENCOUNTER — APPOINTMENT (OUTPATIENT)
Dept: SURGERY | Facility: CLINIC | Age: 58
End: 2023-05-18
Payer: MEDICARE

## 2023-05-18 ENCOUNTER — LABORATORY RESULT (OUTPATIENT)
Age: 58
End: 2023-05-18

## 2023-05-18 VITALS
SYSTOLIC BLOOD PRESSURE: 122 MMHG | WEIGHT: 183 LBS | HEIGHT: 68 IN | BODY MASS INDEX: 27.74 KG/M2 | DIASTOLIC BLOOD PRESSURE: 87 MMHG | HEART RATE: 71 BPM

## 2023-05-18 DIAGNOSIS — E04.1 NONTOXIC SINGLE THYROID NODULE: ICD-10-CM

## 2023-05-18 DIAGNOSIS — Z80.41 FAMILY HISTORY OF MALIGNANT NEOPLASM OF OVARY: ICD-10-CM

## 2023-05-18 DIAGNOSIS — Z80.1 FAMILY HISTORY OF MALIGNANT NEOPLASM OF TRACHEA, BRONCHUS AND LUNG: ICD-10-CM

## 2023-05-18 LAB
25(OH)D3 SERPL-MCNC: 41 NG/ML
CALCIUM SERPL-MCNC: 10 MG/DL
CALCIUM SERPL-MCNC: 10 MG/DL
PARATHYROID HORMONE INTACT: 25 PG/ML
T3 SERPL-MCNC: 117 NG/DL
T4 FREE SERPL-MCNC: 1.4 NG/DL
THYROGLOB AB SERPL-ACNC: <20 IU/ML
THYROPEROXIDASE AB SERPL IA-ACNC: <10 IU/ML
TSH SERPL-ACNC: 1.5 UIU/ML

## 2023-05-18 PROCEDURE — 99204 OFFICE O/P NEW MOD 45 MIN: CPT | Mod: 25

## 2023-05-18 RX ORDER — INDAPAMIDE 1.25 MG/1
1.25 TABLET, FILM COATED ORAL
Qty: 45 | Refills: 3 | Status: COMPLETED | COMMUNITY
Start: 2023-04-05 | End: 2023-05-18

## 2023-05-18 RX ORDER — POTASSIUM BICARBONATE 391 MG/1
10 TABLET, EFFERVESCENT ORAL TWICE DAILY
Qty: 180 | Refills: 3 | Status: COMPLETED | COMMUNITY
Start: 2023-04-05 | End: 2023-05-18

## 2023-05-20 NOTE — ASSESSMENT
[FreeTextEntry1] : will observe thyroid nodules. discussed that size of nodule is below the threshold for which fine needle aspiration cytology is generally recommended in the absence of any suspicious sonographic or clinical findings. doubtful hyperparathyroidism.  bloods drawn. to call next week for results. sonogram next visit. to return earlier if any change. patient has been given the opportunity to ask questions, and all of the patient's questions have been answered to their satisfaction\par

## 2023-05-20 NOTE — REASON FOR VISIT
[Initial Consultation] : an initial consultation for [Family Member] : family member [FreeTextEntry2] : Thyroid nodules,  hypercalciuria

## 2023-05-20 NOTE — HISTORY OF PRESENT ILLNESS
[de-identified] : Pt c/o thyroid nodules, fatigue, kidney stones, constipation. hoarseness, bone pain.  denies dysphagia, SOB or RT exposure.  \par Ca 10.1,  PTH 29,   24 Hr Urinary  Ca 360, TSH 1.36\par sonogram: bilateral subcentimeter thyroid nodules\par I have reviewed all old and new data and available images.  Additional information was obtained from others present at the time of visit to ensure the completeness of the history\par

## 2023-05-20 NOTE — PHYSICAL EXAM
[de-identified] : tender thyroid. no palpable thyroid nodules [Laryngoscopy Performed] : laryngoscopy was performed, see procedure section for findings [Midline] : located in midline position [Normal] : orientation to person, place, and time: normal [de-identified] : indirect  laryngoscopy shows normal vocal cord mobility bilaterally with no lesions noted, arytenoid erythema

## 2023-05-20 NOTE — CONSULT LETTER
[Dear  ___] : Dear  [unfilled], [Consult Letter:] : I had the pleasure of evaluating your patient, [unfilled]. [Please see my note below.] : Please see my note below. [Consult Closing:] : Thank you very much for allowing me to participate in the care of this patient.  If you have any questions, please do not hesitate to contact me. [Sincerely,] : Sincerely, [FreeTextEntry2] : Dr. Jw Tijerina, Dr. David Hoenig, Dr. Nic Elmore [DrRadha  ___] : Dr. CRUZ [FreeTextEntry3] : Jacobo Barnes MD, FACS\par System Director, Endocrine Surgery\par [facility]\par Associate  Professor of Surgery\par [employer] [employer] at [facility]  [DrRadha ___] : Dr. CRUZ

## 2023-05-25 ENCOUNTER — NON-APPOINTMENT (OUTPATIENT)
Age: 58
End: 2023-05-25

## 2023-06-02 ENCOUNTER — TRANSCRIPTION ENCOUNTER (OUTPATIENT)
Age: 58
End: 2023-06-02

## 2023-06-08 ENCOUNTER — APPOINTMENT (OUTPATIENT)
Dept: ULTRASOUND IMAGING | Facility: CLINIC | Age: 58
End: 2023-06-08
Payer: MEDICARE

## 2023-06-08 PROCEDURE — 76770 US EXAM ABDO BACK WALL COMP: CPT

## 2023-06-26 ENCOUNTER — APPOINTMENT (OUTPATIENT)
Dept: CARDIOLOGY | Facility: CLINIC | Age: 58
End: 2023-06-26

## 2023-07-03 ENCOUNTER — APPOINTMENT (OUTPATIENT)
Dept: NEPHROLOGY | Facility: CLINIC | Age: 58
End: 2023-07-03
Payer: MEDICARE

## 2023-07-03 PROCEDURE — 99443: CPT

## 2023-07-03 NOTE — REASON FOR VISIT
[Home] : at home, [unfilled] , at the time of the visit. [Medical Office: ([facility])___] : at the medical office located in  [Verbal consent obtained from patient] : the patient, [unfilled] [Follow-Up] : a follow-up visit

## 2023-07-28 ENCOUNTER — RX RENEWAL (OUTPATIENT)
Age: 58
End: 2023-07-28

## 2023-09-05 ENCOUNTER — NON-APPOINTMENT (OUTPATIENT)
Age: 58
End: 2023-09-05

## 2023-09-12 ENCOUNTER — TRANSCRIPTION ENCOUNTER (OUTPATIENT)
Age: 58
End: 2023-09-12

## 2023-09-15 ENCOUNTER — TRANSCRIPTION ENCOUNTER (OUTPATIENT)
Age: 58
End: 2023-09-15

## 2023-09-15 NOTE — H&P PST ADULT - NSALCOHOLFREQ_GEN_A_CORE_SD
"Patient ambulated out of room stating, "I have to go, I have to go pick my kids up from school." Patient did not want to wait to speak with physician and ambulated out of department with strong, steady gait. Will inform charge nurse and EDP.   " monthly or less

## 2023-10-24 ENCOUNTER — APPOINTMENT (OUTPATIENT)
Dept: SURGERY | Facility: CLINIC | Age: 58
End: 2023-10-24

## 2024-04-04 DIAGNOSIS — R30.0 DYSURIA: ICD-10-CM

## 2024-04-11 ENCOUNTER — APPOINTMENT (OUTPATIENT)
Dept: ULTRASOUND IMAGING | Facility: CLINIC | Age: 59
End: 2024-04-11
Payer: MEDICARE

## 2024-04-11 ENCOUNTER — OUTPATIENT (OUTPATIENT)
Dept: OUTPATIENT SERVICES | Facility: HOSPITAL | Age: 59
LOS: 1 days | End: 2024-04-11
Payer: COMMERCIAL

## 2024-04-11 DIAGNOSIS — Z98.890 OTHER SPECIFIED POSTPROCEDURAL STATES: Chronic | ICD-10-CM

## 2024-04-11 DIAGNOSIS — Z96.0 PRESENCE OF UROGENITAL IMPLANTS: Chronic | ICD-10-CM

## 2024-04-11 DIAGNOSIS — N20.0 CALCULUS OF KIDNEY: ICD-10-CM

## 2024-04-11 PROCEDURE — 76770 US EXAM ABDO BACK WALL COMP: CPT

## 2024-04-11 PROCEDURE — 76770 US EXAM ABDO BACK WALL COMP: CPT | Mod: 26

## 2024-04-25 ENCOUNTER — APPOINTMENT (OUTPATIENT)
Dept: UROLOGY | Facility: CLINIC | Age: 59
End: 2024-04-25
Payer: MEDICARE

## 2024-04-25 VITALS
HEART RATE: 82 BPM | SYSTOLIC BLOOD PRESSURE: 142 MMHG | OXYGEN SATURATION: 99 % | BODY MASS INDEX: 29.1 KG/M2 | DIASTOLIC BLOOD PRESSURE: 90 MMHG | HEIGHT: 68 IN | TEMPERATURE: 97.4 F | WEIGHT: 192 LBS

## 2024-04-25 DIAGNOSIS — R82.994 HYPERCALCIURIA: ICD-10-CM

## 2024-04-25 DIAGNOSIS — R82.991 HYPOCITRATURIA: ICD-10-CM

## 2024-04-25 DIAGNOSIS — N20.0 CALCULUS OF KIDNEY: ICD-10-CM

## 2024-04-25 DIAGNOSIS — Q61.5 MEDULLARY CYSTIC KIDNEY: ICD-10-CM

## 2024-04-25 LAB — TSH SERPL-ACNC: 1.03 UIU/ML

## 2024-04-25 PROCEDURE — 99214 OFFICE O/P EST MOD 30 MIN: CPT

## 2024-04-25 NOTE — HISTORY OF PRESENT ILLNESS
[FreeTextEntry1] : Pt returns for metabolic evaluation and prevention of future stone disease following recent surgery for stone.  At issue today is review of the stone analysis, risk factors for future stone episodes and establishing whether they have pure dietary, metabolic, or mixed causes for their stone risks which is unrelated to the surgical management of their stone disease. Now 58 yo with mult issues, including GI.  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 couldn't take 12.5mg HCTZ for hypercalciuria, but misses when flares of stomach pain/burning has stopped potassium citrate [None] : no symptoms

## 2024-04-25 NOTE — ASSESSMENT
[FreeTextEntry1] : US KIDNEYS AND BLADDER  - ORDERED BY: DAVID M HOENIG  04/11/2024  FINDINGS: Right kidney: 12.0 cm. Lower pole nonobstructing calculus measures 8 mm, not well seen on prior and may be new. Lower pole cyst measures up to 5.5 cm.  Left kidney: 11.9 cm. Mid and lower pole calculi measuring 8 mm and 1.0 cm. Scattered renal cysts measuring up to 1.9 cm. Bilateral ureteral jets. Bilateral nonobstructing renal calculi, which may be new on the right since 6/8/2023. No hydronephrosis.  unable to joshua higher dose of HCTZ, or potassium citrate at this time  Will cont to f/u RTC 6 months with renal US

## 2024-04-26 LAB
ALBUMIN SERPL ELPH-MCNC: 4.7 G/DL
ALP BLD-CCNC: 88 U/L
ALT SERPL-CCNC: 39 U/L
ANION GAP SERPL CALC-SCNC: 15 MMOL/L
AST SERPL-CCNC: 31 U/L
BILIRUB DIRECT SERPL-MCNC: 0.1 MG/DL
BILIRUB INDIRECT SERPL-MCNC: 0.4 MG/DL
BILIRUB SERPL-MCNC: 0.5 MG/DL
BUN SERPL-MCNC: 11 MG/DL
CALCIUM SERPL-MCNC: 9.6 MG/DL
CHLORIDE SERPL-SCNC: 103 MMOL/L
CHOLEST SERPL-MCNC: 286 MG/DL
CO2 SERPL-SCNC: 24 MMOL/L
CREAT SERPL-MCNC: 0.78 MG/DL
EGFR: 89 ML/MIN/1.73M2
ESTIMATED AVERAGE GLUCOSE: 117 MG/DL
GLUCOSE SERPL-MCNC: 99 MG/DL
HBA1C MFR BLD HPLC: 5.7 %
HDLC SERPL-MCNC: 70 MG/DL
LDLC SERPL CALC-MCNC: 199 MG/DL
LDLC SERPL DIRECT ASSAY-MCNC: 204 MG/DL
MAGNESIUM SERPL-MCNC: 2.3 MG/DL
NONHDLC SERPL-MCNC: 216 MG/DL
POTASSIUM SERPL-SCNC: 4.9 MMOL/L
PROT SERPL-MCNC: 7.3 G/DL
SODIUM SERPL-SCNC: 142 MMOL/L
TRIGL SERPL-MCNC: 98 MG/DL
URATE SERPL-MCNC: 4.2 MG/DL

## 2024-04-27 LAB
APPEARANCE: CLEAR
BACTERIA UR CULT: NORMAL
BACTERIA: NEGATIVE /HPF
BILIRUBIN URINE: NEGATIVE
BLOOD URINE: ABNORMAL
CAST: 0 /LPF
COLOR: YELLOW
EPITHELIAL CELLS: 0 /HPF
GLUCOSE QUALITATIVE U: NEGATIVE MG/DL
KETONES URINE: NEGATIVE MG/DL
LEUKOCYTE ESTERASE URINE: NEGATIVE
MICROSCOPIC-UA: NORMAL
NITRITE URINE: NEGATIVE
PH URINE: 6.5
PROTEIN URINE: NEGATIVE MG/DL
RED BLOOD CELLS URINE: 2 /HPF
SPECIFIC GRAVITY URINE: 1.01
UROBILINOGEN URINE: 0.2 MG/DL
WHITE BLOOD CELLS URINE: 0 /HPF

## 2024-04-29 ENCOUNTER — NON-APPOINTMENT (OUTPATIENT)
Age: 59
End: 2024-04-29

## 2024-05-02 ENCOUNTER — TRANSCRIPTION ENCOUNTER (OUTPATIENT)
Age: 59
End: 2024-05-02

## 2024-05-03 ENCOUNTER — TRANSCRIPTION ENCOUNTER (OUTPATIENT)
Age: 59
End: 2024-05-03

## 2024-05-06 ENCOUNTER — LABORATORY RESULT (OUTPATIENT)
Age: 59
End: 2024-05-06

## 2024-05-07 ENCOUNTER — APPOINTMENT (OUTPATIENT)
Dept: CARDIOLOGY | Facility: CLINIC | Age: 59
End: 2024-05-07
Payer: MEDICARE

## 2024-05-07 ENCOUNTER — NON-APPOINTMENT (OUTPATIENT)
Age: 59
End: 2024-05-07

## 2024-05-07 VITALS
OXYGEN SATURATION: 98 % | HEIGHT: 68 IN | DIASTOLIC BLOOD PRESSURE: 60 MMHG | BODY MASS INDEX: 28.42 KG/M2 | HEART RATE: 65 BPM | RESPIRATION RATE: 16 BRPM | WEIGHT: 187.5 LBS | SYSTOLIC BLOOD PRESSURE: 110 MMHG | TEMPERATURE: 98.1 F

## 2024-05-07 DIAGNOSIS — Z78.9 OTHER SPECIFIED HEALTH STATUS: ICD-10-CM

## 2024-05-07 DIAGNOSIS — I25.84 ATHEROSCLEROTIC HEART DISEASE OF NATIVE CORONARY ARTERY W/OUT ANGINA PECTORIS: ICD-10-CM

## 2024-05-07 DIAGNOSIS — I25.10 ATHEROSCLEROTIC HEART DISEASE OF NATIVE CORONARY ARTERY W/OUT ANGINA PECTORIS: ICD-10-CM

## 2024-05-07 DIAGNOSIS — E78.01 FAMILIAL HYPERCHOLESTEROLEMIA: ICD-10-CM

## 2024-05-07 DIAGNOSIS — I34.1 NONRHEUMATIC MITRAL (VALVE) PROLAPSE: ICD-10-CM

## 2024-05-07 DIAGNOSIS — R03.0 ELEVATED BLOOD-PRESSURE READING, W/OUT DIAGNOSIS OF HYPERTENSION: ICD-10-CM

## 2024-05-07 PROCEDURE — G2211 COMPLEX E/M VISIT ADD ON: CPT

## 2024-05-07 PROCEDURE — 99214 OFFICE O/P EST MOD 30 MIN: CPT

## 2024-05-07 NOTE — REASON FOR VISIT
[CV Risk Factors and Non-Cardiac Disease] : CV risk factors and non-cardiac disease [Arrhythmia/ECG Abnorrmalities] : arrhythmia/ECG abnormalities [Structural Heart and Valve Disease] : structural heart and valve disease [Hyperlipidemia] : hyperlipidemia [FreeTextEntry1] : This is a 59-year-old female with a past medical history significant for mitral valve prolapse, Landin's esophagus, nephrolithiasis, psoriasis, COVID-19 (2/2022) and familial hypercholesterolemia who presents for follow up cardiac/lipid evaluation. Cardiac risk factors include familial hypercholesterolemia and family history (mother- diabetes; father- cardiac disease). She denies history of smoking, diabetes, hypertension, rheumatic fever, and does not drink excessive caffeine or alcohol.   Patient is feeling well today but does complain of some palpitations and dyspnea on exertion, typically when going up the stairs. She denies chest pain, shortness of breath, dizziness, headaches, or syncope. She was previously on Rosuvastatin 20 mg but had side effects including brain fog, fatigue, and some muscle aches in bilateral lower extremities. She was then started on Nexlizet but stopped it when she experienced severe bilateral Achilles tendon pain. Patient states she has a healthy diet and has limited the cholesterol heavy foods, and she is also walking 7,000 steps a day.  Lipid panel 04/25/2024 showed total cholesterol 286, HDL 70, LDL calc 199, non-, LDL direct 204, triglycerides 98.  Patient would be a good candidate for PCSK-9 inhibitor injection therapy due to significantly elevated LDL and statin and Nexlizet intolerance. Patient is agreeable to start the injection if approved by her insurance.  She will have labs drawn today for genetic testing, Lipoprotein (a), and Apolipoprotein B. She will get new labs in July to assess lipid panel after starting the Repatha for a couple weeks.   Cardiac CT 2021 showed a calcium score of 7. EKG 05/07/2024: normal sinus rhythm with decreasing R-wave progression and low voltage in precordial leads at a rate of 65 bpm.  Echo 02/19/2023: 1. The left ventricular systolic function is normal with an ejection fraction of 64% by visual interpretation visually estimated at 60 to 65%. 2. Mild mitral regurgitation. 3. Minimal tricuspid regurgitation. 4. Sigmoid basal inter-ventricular septum. 5. Thickened mitral valve leaflets with mild prolapse of the anterior leaflet. 6. Trace pulmonic regurgitation.    [facility] Patient has stopped Nexlizet 180/10mg since November 2022 after she started having severe achilles tendon pain. Patient is generally doing well but does report occasional dyspnea on exertion, palpitations and SOB at night. She did have a CPAP machine at one point however has not used this for the past few months. She is still experiencing tendon pain in foot/achilles and cramps so she has been working closely with rheumatologist. She states that she has been following up with her neurologist and nephrologist also. Patient is dealing with multiple health concern, thyroid nodules, kidney stones, wants to sort that out before going back on cholesterol medications. She also wants to work with registered dietitian for a few months.  Last blood work 11/2022 when patient was on Nexlizet: Cholesterol 145, Triglycerides 88, LDL 62, HDL 65 CRF: familial hypercholesterolemia

## 2024-05-07 NOTE — DISCUSSION/SUMMARY
[FreeTextEntry1] : This is a 57-year-old female with past medical history significant mitral valve prolapse, heterozygous familial hypercholesterolemia.  Status post COVID-19 in February 2022, dyspepsia, Landin's esophagus, nephrolithiasis, psoriasis, who comes in for cardiac follow-up evaluation. She denies chest pain, shortness of breath, dizziness or syncope. Electrocardiogram done May 7, 2024 demonstrated normal sinus rhythm rate 65 bpm is otherwise unremarkable. Lipid panel done April 25, 2024 demonstrated cholesterol 286, LDL direct of 204 mg/dL, non-HDL cholesterol 216 mg/dL, triglycerides 98 mg/dL and HDL 70 mg/dL. These results are consistent with heterozygous familial hypercholesterolemia. The patient will have genetic testing for LDL receptor mutation today. Echo Doppler examination done February 13, 2023 demonstrated normal left ventricular ejection fraction 64% with mild mitral valve regurgitation, minimal tricuspid valve regurgitation, trace pulmonic valve regurgitation, and thickened mitral valve leaflets with mild prolapse of the anterior mitral valve leaflet. The patient had statin intolerance with Crestor therapy.  She had irritation of her Achilles on Nexlezet therapy. She is an excellent candidate for PCSK9 injectable therapy.  I have explained to her the benefits as well as side effects associated with PCSK9 injectable therapy.  She agrees to starting Repatha. She will follow-up blood work in 6 to 8 weeks after starting injectable therapy. I have also recommended she work with our registered dietitian. The patient had a normal exercise stress test February 13, 2023. She will have new blood work done for electrolytes, lipoprotein a, lipoprotein B and lipid panel as well as genetic testing. She discontinued her cholesterol medicine because she found she had thyroid nodules.  I have explained to her that she will need lipid-lowering therapy. Further recommendations will made after results of today's blood work available for my review. PMH: She is also concerned about her right carotid artery, because when she is laying flat on her right side she feels intense pulsations of her right carotid artery she is concerned that she has carotid artery disease with "blood pooling in her neck".  I have tried to reassure her.    She instructed follow-up with her primary care physician as well. Blood work done November 19, 2022 demonstrated cholesterol of 145, HDL 65, triglycerides 88, LDL 62 and non-HDL cholesterol 80 mg/dL. Coronary artery calcium score done December 2, 2021 was 7, with all 7 units in left anterior descending artery. The patient discontinued Crestor 20 mg daily on her own March 3, 2021 due to feelings of fatigue, muscle aches, and backaches.  She stopped the medication and "felt well". The patient will continue to work on her diet and exercise program, however if her LDL is consistent with heterozygous familial hypercholesterolemia, statin therapy to reduce LDL to a target of less than 100 mg/dL is appropriate.  Echo Doppler examination done February 2, 2021 demonstrated minimal to mild mitral and tricuspid valve regurgitation, physiologic pulmonic valve regurgitation, normal left ventricular function, and estimated ejection fraction 68%. The patient had a lipid panel done April 12, 2021 which demonstrated total cholesterol 236, calculated  mg/dL, non-HDL cholesterol 180 mg/dL and HDL of 56 mg/dL, and direct LDL cholesterol 161 mg/dL. She is currently hemodynamically stable and asymptomatic from a cardiac standpoint.  The patient had normal exercise stress test February 2, 2021.. Electrocardiogram done January 12, 2021 demonstrated normal sinus rhythm rate 64 bpm and is otherwise unremarkable. Patient had blood work done January 4, 2021 which demonstrated a total cholesterol 276 mg/dL, HDL 69 mg/dL, calculated LDL cholesterol 190 mg/dL. These findings are consistent with heterozygous familial hypercholesterolemia. She is also concerned about her children, given her 's sudden death felt to be related to myocardial infarction.   She has been complaining of a buzzing sensation in her chest since December 2020.  She denies a specific rapid heartbeat palpitation, or skipped heartbeat. She does not drink excessive caffeine or alcohol.  She has no history of rheumatic fever.  She has been under a lot of stress recently related to the loss of her .  Her cardiac risk factors include hypercholesterolemia. Blood work-January 4, 2021 demonstrated a calculated LDL cholesterol 190 mg/dL, total cholesterol 276 mg/dL, HDL 69 mg/dL, triglycerides 86 mg/dL and are consistent with familial heterozygous hypercholesterolemia.   I have also asked her to make an appointment with our registered dietitian.   The patient understands that aerobic exercises must be increased to 40 minutes 4 times per week. A detailed discussion of lifestyle modification was done today. The patient has a good understanding of the diagnosis, and treatment plan. Lifestyle modification was also outlined.

## 2024-05-07 NOTE — CARDIOLOGY SUMMARY
[___] : [unfilled] [de-identified] : 5/31/22 [de-identified] : 24 hour Holter Monitor [de-identified] : 2/2/2021 [de-identified] : 2/2/2021

## 2024-05-07 NOTE — PHYSICAL EXAM
[Well Developed] : well developed [Well Nourished] : well nourished [No Acute Distress] : no acute distress [Normal Venous Pressure] : normal venous pressure [No Carotid Bruit] : no carotid bruit [Normal S1, S2] : normal S1, S2 [No Rub] : no rub [Murmur] : murmur [Clear Lung Fields] : clear lung fields [Good Air Entry] : good air entry [No Respiratory Distress] : no respiratory distress  [Soft] : abdomen soft [Non Tender] : non-tender [No Masses/organomegaly] : no masses/organomegaly [Normal Bowel Sounds] : normal bowel sounds [Normal Gait] : normal gait [No Edema] : no edema [No Cyanosis] : no cyanosis [No Clubbing] : no clubbing [No Varicosities] : no varicosities [No Rash] : no rash [No Skin Lesions] : no skin lesions [Moves all extremities] : moves all extremities [No Focal Deficits] : no focal deficits [Normal Speech] : normal speech [Alert and Oriented] : alert and oriented [Normal memory] : normal memory [General Appearance - Well Developed] : well developed [Normal Appearance] : normal appearance [General Appearance - Well Nourished] : well nourished [Normal Conjunctiva] : the conjunctiva exhibited no abnormalities [Normal Oral Mucosa] : normal oral mucosa [No Oral Pallor] : no oral pallor [Normal Oropharynx] : normal oropharynx [Normal Jugular Venous V Waves Present] : normal jugular venous V waves present [Respiration, Rhythm And Depth] : normal respiratory rhythm and effort [Exaggerated Use Of Accessory Muscles For Inspiration] : no accessory muscle use [Auscultation Breath Sounds / Voice Sounds] : lungs were clear to auscultation bilaterally [Bowel Sounds] : normal bowel sounds [Abdomen Soft] : soft [Abnormal Walk] : normal gait [Nail Clubbing] : no clubbing of the fingernails [Cyanosis, Localized] : no localized cyanosis [Skin Turgor] : normal skin turgor [] : no rash [Oriented To Time, Place, And Person] : oriented to person, place, and time [Impaired Insight] : insight and judgment were intact [Affect] : the affect was normal [5th Left ICS - MCL] : palpated at the 5th LICS in the midclavicular line [Normal] : normal [No Precordial Heave] : no precordial heave was noted [Normal Rate] : normal [Rhythm Regular] : regular [Normal S1] : normal S1 [Normal S2] : normal S2 [No Gallop] : no gallop heard [I] : a grade 1 [2+] : left 2+ [No Abnormalities] : the abdominal aorta was not enlarged and no bruit was heard [No Pitting Edema] : no pitting edema present [II] : a grade 2 [Apical Thrill] : no thrill palpable at the apex [Rt] : no varicose veins of the right leg [Lt] : no varicose veins of the left leg [Right Carotid Bruit] : no bruit heard over the right carotid [Left Carotid Bruit] : no bruit heard over the left carotid [Right Femoral Bruit] : no bruit heard over the right femoral artery [Left Femoral Bruit] : no bruit heard over the left femoral artery [S3] : no S3 [S4] : no S4 [Click] : no click [Pericardial Rub] : no pericardial rub

## 2024-05-08 ENCOUNTER — TRANSCRIPTION ENCOUNTER (OUTPATIENT)
Age: 59
End: 2024-05-08

## 2024-05-09 RX ORDER — EVOLOCUMAB 140 MG/ML
140 INJECTION, SOLUTION SUBCUTANEOUS
Qty: 6 | Refills: 1 | Status: DISCONTINUED | COMMUNITY
Start: 2024-05-02 | End: 2024-05-09

## 2024-05-13 RX ORDER — EVOLOCUMAB 140 MG/ML
140 INJECTION, SOLUTION SUBCUTANEOUS
Qty: 6 | Refills: 1 | Status: ACTIVE | COMMUNITY
Start: 2024-05-07

## 2024-05-15 RX ORDER — TAMSULOSIN HYDROCHLORIDE 0.4 MG/1
0.4 CAPSULE ORAL
Qty: 30 | Refills: 3 | Status: DISCONTINUED | COMMUNITY
Start: 2023-05-17 | End: 2024-05-15

## 2024-05-29 ENCOUNTER — TRANSCRIPTION ENCOUNTER (OUTPATIENT)
Age: 59
End: 2024-05-29

## 2024-06-17 ENCOUNTER — TRANSCRIPTION ENCOUNTER (OUTPATIENT)
Age: 59
End: 2024-06-17

## 2024-07-29 ENCOUNTER — RX RENEWAL (OUTPATIENT)
Age: 59
End: 2024-07-29

## 2024-07-31 ENCOUNTER — LABORATORY RESULT (OUTPATIENT)
Age: 59
End: 2024-07-31

## 2024-07-31 ENCOUNTER — APPOINTMENT (OUTPATIENT)
Dept: CARDIOLOGY | Facility: CLINIC | Age: 59
End: 2024-07-31
Payer: MEDICARE

## 2024-07-31 VITALS
RESPIRATION RATE: 16 BRPM | HEART RATE: 79 BPM | BODY MASS INDEX: 28.64 KG/M2 | WEIGHT: 189 LBS | SYSTOLIC BLOOD PRESSURE: 123 MMHG | OXYGEN SATURATION: 99 % | HEIGHT: 68 IN | TEMPERATURE: 98.2 F | DIASTOLIC BLOOD PRESSURE: 79 MMHG

## 2024-07-31 DIAGNOSIS — E78.41 ELEVATED LIPOPROTEIN(A): ICD-10-CM

## 2024-07-31 DIAGNOSIS — I25.10 ATHEROSCLEROTIC HEART DISEASE OF NATIVE CORONARY ARTERY W/OUT ANGINA PECTORIS: ICD-10-CM

## 2024-07-31 PROCEDURE — G2211 COMPLEX E/M VISIT ADD ON: CPT

## 2024-07-31 PROCEDURE — 99215 OFFICE O/P EST HI 40 MIN: CPT

## 2024-07-31 RX ORDER — EZETIMIBE 10 MG/1
10 TABLET ORAL DAILY
Qty: 90 | Refills: 1 | Status: ACTIVE | COMMUNITY
Start: 2024-07-31 | End: 1900-01-01

## 2024-07-31 NOTE — PHYSICAL EXAM
[Well Developed] : well developed [Well Nourished] : well nourished [No Acute Distress] : no acute distress [Normal Venous Pressure] : normal venous pressure [No Carotid Bruit] : no carotid bruit [Normal S1, S2] : normal S1, S2 [No Rub] : no rub [Murmur] : murmur [II] : a grade 2 [Clear Lung Fields] : clear lung fields [Good Air Entry] : good air entry [No Respiratory Distress] : no respiratory distress  [Soft] : abdomen soft [Non Tender] : non-tender [No Masses/organomegaly] : no masses/organomegaly [Normal Bowel Sounds] : normal bowel sounds [Normal Gait] : normal gait [No Edema] : no edema [No Cyanosis] : no cyanosis [No Clubbing] : no clubbing [No Varicosities] : no varicosities [No Rash] : no rash [No Skin Lesions] : no skin lesions [Moves all extremities] : moves all extremities [No Focal Deficits] : no focal deficits [Normal Speech] : normal speech [Alert and Oriented] : alert and oriented [Normal memory] : normal memory [General Appearance - Well Developed] : well developed [Normal Appearance] : normal appearance [General Appearance - Well Nourished] : well nourished [Normal Conjunctiva] : the conjunctiva exhibited no abnormalities [Normal Oral Mucosa] : normal oral mucosa [No Oral Pallor] : no oral pallor [Normal Oropharynx] : normal oropharynx [Normal Jugular Venous V Waves Present] : normal jugular venous V waves present [Respiration, Rhythm And Depth] : normal respiratory rhythm and effort [Exaggerated Use Of Accessory Muscles For Inspiration] : no accessory muscle use [Auscultation Breath Sounds / Voice Sounds] : lungs were clear to auscultation bilaterally [Bowel Sounds] : normal bowel sounds [Abdomen Soft] : soft [Abnormal Walk] : normal gait [Nail Clubbing] : no clubbing of the fingernails [Cyanosis, Localized] : no localized cyanosis [Skin Turgor] : normal skin turgor [] : no rash [Oriented To Time, Place, And Person] : oriented to person, place, and time [Impaired Insight] : insight and judgment were intact [Affect] : the affect was normal [5th Left ICS - MCL] : palpated at the 5th LICS in the midclavicular line [Normal] : normal [No Precordial Heave] : no precordial heave was noted [Normal Rate] : normal [Rhythm Regular] : regular [Normal S1] : normal S1 [Normal S2] : normal S2 [No Gallop] : no gallop heard [I] : a grade 1 [2+] : left 2+ [No Abnormalities] : the abdominal aorta was not enlarged and no bruit was heard [No Pitting Edema] : no pitting edema present [Apical Thrill] : no thrill palpable at the apex [Rt] : no varicose veins of the right leg [Lt] : no varicose veins of the left leg [Right Carotid Bruit] : no bruit heard over the right carotid [Left Carotid Bruit] : no bruit heard over the left carotid [Right Femoral Bruit] : no bruit heard over the right femoral artery [Left Femoral Bruit] : no bruit heard over the left femoral artery [S3] : no S3 [S4] : no S4 [Click] : no click [Pericardial Rub] : no pericardial rub

## 2024-07-31 NOTE — REASON FOR VISIT
[CV Risk Factors and Non-Cardiac Disease] : CV risk factors and non-cardiac disease [Structural Heart and Valve Disease] : structural heart and valve disease [Hyperlipidemia] : hyperlipidemia [FreeTextEntry1] : This is a 59-year-old female with a past medical history significant for mitral valve prolapse, Landin's esophagus, nephrolithiasis, psoriasis, COVID-19 (2/2022) and familial hypercholesterolemia who presents for follow up cardiac/lipid evaluation. Cardiac risk factors include familial hypercholesterolemia and family history (mother- diabetes; father- cardiac disease). She denies history of smoking, diabetes, hypertension, rheumatic fever, and does not drink excessive caffeine or alcohol.   Patient is feeling well today but does complain of some palpitations and dyspnea on exertion, typically when going up the stairs. She denies chest pain, shortness of breath, dizziness, headaches, or syncope. She was previously on Rosuvastatin 20 mg but had side effects including brain fog, fatigue, and some muscle aches in bilateral lower extremities. She was then started on Nexlizet but stopped it when she experienced severe bilateral Achilles tendon pain. Patient states she has a healthy diet and has limited the cholesterol heavy foods, and she is also walking 7,000 steps a day.  Lipid panel 04/25/2024 showed total cholesterol 286, HDL 70, LDL calc 199, non-, LDL direct 204, triglycerides 98.  Patient would be a good candidate for PCSK-9 inhibitor injection therapy due to significantly elevated LDL and statin and Nexlizet intolerance. Patient is agreeable to start the injection if approved by her insurance.  She will have labs drawn today for genetic testing, Lipoprotein (a), and Apolipoprotein B. She will get new labs in July to assess lipid panel after starting the Repatha for a couple weeks.   Cardiac CT 2021 showed a calcium score of 7. EKG 05/07/2024: normal sinus rhythm with decreasing R-wave progression and low voltage in precordial leads at a rate of 65 bpm.  Echo 02/19/2023: 1. The left ventricular systolic function is normal with an ejection fraction of 64% by visual interpretation visually estimated at 60 to 65%. 2. Mild mitral regurgitation. 3. Minimal tricuspid regurgitation. 4. Sigmoid basal inter-ventricular septum. 5. Thickened mitral valve leaflets with mild prolapse of the anterior leaflet. 6. Trace pulmonic regurgitation.    [facility] Patient has stopped Nexlizet 180/10mg since November 2022 after she started having severe achilles tendon pain. Patient is generally doing well but does report occasional dyspnea on exertion, palpitations and SOB at night. She did have a CPAP machine at one point however has not used this for the past few months. She is still experiencing tendon pain in foot/achilles and cramps so she has been working closely with rheumatologist. She states that she has been following up with her neurologist and nephrologist also. Patient is dealing with multiple health concern, thyroid nodules, kidney stones, wants to sort that out before going back on cholesterol medications. She also wants to work with registered dietitian for a few months.  Last blood work 11/2022 when patient was on Nexlizet: Cholesterol 145, Triglycerides 88, LDL 62, HDL 65 CRF: familial hypercholesterolemia

## 2024-07-31 NOTE — DISCUSSION/SUMMARY
[FreeTextEntry1] : This is a 59-year-old female with past medical history significant mitral valve prolapse, heterozygous familial hypercholesterolemia, elevated lipoprotein a, status post COVID-19 in February 2022, dyspepsia, Landin's esophagus, nephrolithiasis, psoriasis, who comes in for cardiac follow-up evaluation. She denies chest pain, shortness of breath, dizziness or syncope. The patient reports that she is having trouble losing weight despite maintaining a lower caloric intake.  She is exercising 30 to 35 minutes 4 times per week.  She reports she has some limitations related to her Achilles injury. Lipid panel done May 7, 2024 demonstrated elevated lipoprotein a of 229 nmol/L, LDL calculated 203 mg/dL, cholesterol 289, HDL of 73, triglycerides of 81, non-HDL cholesterol 216 mg/dL with a lipoprotein B of 137 mg/dL. The patient continues on Repatha 140 mg subcutaneously every 2 weeks without side effects.  I would also recommend the addition of Zetia 10 mg daily to reduce her LDL cholesterol further. She will continue on her current diet and exercise program.  She has worked with a registered dietitian but has not made significant weight reduction target. She is interested in GLP agonist therapy she has no personal or family history of medullary thyroid cancer or M EN 2 syndrome. She will have new blood work done today and consideration regarding GLP 1 agonist therapy and other medical interventions can be discussed when the results are available for my review. Electrocardiogram done May 7, 2024 demonstrated normal sinus rhythm rate 65 bpm is otherwise unremarkable. Lipid panel done April 25, 2024 demonstrated cholesterol 286, LDL direct of 204 mg/dL, non-HDL cholesterol 216 mg/dL, triglycerides 98 mg/dL and HDL 70 mg/dL. These results are consistent with heterozygous familial hypercholesterolemia. The patient will have genetic testing for LDL receptor mutation today. Echo Doppler examination done February 13, 2023 demonstrated normal left ventricular ejection fraction 64% with mild mitral valve regurgitation, minimal tricuspid valve regurgitation, trace pulmonic valve regurgitation, and thickened mitral valve leaflets with mild prolapse of the anterior mitral valve leaflet. The patient had statin intolerance with Crestor therapy.  She had irritation of her Achilles on Nexlezet therapy I have also recommended she work with our registered dietitian. The patient had a normal exercise stress test February 13, 2023. She will have new blood work done for electrolytes, lipoprotein a, lipoprotein B and lipid panel as well as genetic testing. She discontinued her cholesterol medicine because she found she had thyroid nodules.  I have explained to her that she will need lipid-lowering therapy. Further recommendations will made after results of today's blood work available for my review. PMH: She is also concerned about her right carotid artery, because when she is laying flat on her right side she feels intense pulsations of her right carotid artery she is concerned that she has carotid artery disease with "blood pooling in her neck".  I have tried to reassure her.    She instructed follow-up with her primary care physician as well. Blood work done November 19, 2022 demonstrated cholesterol of 145, HDL 65, triglycerides 88, LDL 62 and non-HDL cholesterol 80 mg/dL. Coronary artery calcium score done December 2, 2021 was 7, with all 7 units in left anterior descending artery. The patient discontinued Crestor 20 mg daily on her own March 3, 2021 due to feelings of fatigue, muscle aches, and backaches.  She stopped the medication and "felt well". The patient will continue to work on her diet and exercise program, however if her LDL is consistent with heterozygous familial hypercholesterolemia, statin therapy to reduce LDL to a target of less than 100 mg/dL is appropriate.  Echo Doppler examination done February 2, 2021 demonstrated minimal to mild mitral and tricuspid valve regurgitation, physiologic pulmonic valve regurgitation, normal left ventricular function, and estimated ejection fraction 68%. The patient had a lipid panel done April 12, 2021 which demonstrated total cholesterol 236, calculated  mg/dL, non-HDL cholesterol 180 mg/dL and HDL of 56 mg/dL, and direct LDL cholesterol 161 mg/dL. She is currently hemodynamically stable and asymptomatic from a cardiac standpoint.  The patient had normal exercise stress test February 2, 2021.. Electrocardiogram done January 12, 2021 demonstrated normal sinus rhythm rate 64 bpm and is otherwise unremarkable. Patient had blood work done January 4, 2021 which demonstrated a total cholesterol 276 mg/dL, HDL 69 mg/dL, calculated LDL cholesterol 190 mg/dL. These findings are consistent with heterozygous familial hypercholesterolemia. She is also concerned about her children, given her 's sudden death felt to be related to myocardial infarction.   She has been complaining of a buzzing sensation in her chest since December 2020.  She denies a specific rapid heartbeat palpitation, or skipped heartbeat. She does not drink excessive caffeine or alcohol.  She has no history of rheumatic fever.  She has been under a lot of stress recently related to the loss of her .  Her cardiac risk factors include hypercholesterolemia. Blood work-January 4, 2021 demonstrated a calculated LDL cholesterol 190 mg/dL, total cholesterol 276 mg/dL, HDL 69 mg/dL, triglycerides 86 mg/dL and are consistent with familial heterozygous hypercholesterolemia.   I have also asked her to make an appointment with our registered dietitian.   The patient understands that aerobic exercises must be increased to 40 minutes 4 times per week. A detailed discussion of lifestyle modification was done today. The patient has a good understanding of the diagnosis, and treatment plan. Lifestyle modification was also outlined.

## 2024-07-31 NOTE — CARDIOLOGY SUMMARY
[___] : [unfilled] [de-identified] : 5/31/22 [de-identified] : 24 hour Holter Monitor [de-identified] : 2/2/2021 [de-identified] : 2/2/2021

## 2024-07-31 NOTE — ASSESSMENT
[FreeTextEntry1] : Prior note nurse practitioner Tara 2022::\par  \par  This is a 57 year year old female here today for follow up cardiac evaluation. \par  She has a past medical history significant for mitral valve prolapse, Landin's esophagus, nephrolithiasis, psoriasis.\par  \par  CHIEF COMPLAINT:\par  Today she is feeling generally well and does not have any complaints at this time.  She states that she is no longer on hydrochlorothiazide as per her nephrologist.  She would like to make a note that she has been experiencing frequent palpitations as of lately that have also been waking her up from her sleep.  She did have a CPAP machine at one point however has not used this for the past 2 months.  She denies any chest pain.  She may have occasional dyspnea on exertion when going up some steps.  \par  \par  She is planning on following up with a neurologist later on this month.\par  \par  -She states she has been trying to work on better diet and exercise to help with her cholesterol although she has a history for FH.\par  \par  -She does not drink excessive caffeine or alcohol.  She has no history of rheumatic fever.  She has been under a lot of stress recently related to the loss of her .  Her cardiac risk factors include hypercholesterolemia.\par  \par  BLOOD PRESSURE:\par  -BP is well controlled in today's visit.\par  \par  -I have discussed the importance of maintaining good BP control and reviewed the newest guidelines with the patient while re-enforcing dietary sodium restrictions to no more than 2-3 g daily, DASH diet, life style modifications as well as the goal of maintaining ideal body weight with the patient today. I have advised the patient to avoid the use of over-the-counter medications/ supplements especially NSAIDS.\par  \par  I have reviewed with Ms. PAGAN that serious health consequences can occur when blood pressure is not well controlled and the need for strict compliance with medication and that optimal control can significantly reduce the risk of cardiovascular disease stroke, heart attack and other organ damage. They verbally expressed understanding of the fore mentioned serious health consequences to me today.\par  \par  BLOOD WORK:\par  -Recent blood work done 2022 demonstrated normal lab values.\par  -Blood work done at [facility] doctor's office demonstrated cholesterol of 223 and LDL of 186\par  \par  PMH:\par  She was placed on Rosuvastatin 20mg PO DAILY due to Ca score of 7 units. She does have a hx for HLD. She states that she has not been compliant with the Rosuvastatin due to her not feeling well on it (very bloated). She had issues with Rosuvastatin in the past as well and was told to rechallenge herself due to her increased CA score. She was prescribed Repatha but is not taking it at this time. \par  \par  -She has a history of FH and is concerned about her risk for CAD/blockage since her   from an MI.  \par  \par  CHOLESTEROL CONTROL:\par  -Patient will continue the advised  TLC diet and to continue follow-up for treatment of hyperlipidemia and repeat blood testing with diet and exercise. I have discussed different exercises and the importance of maintenance of optimal body weight. The importance of staying within guidelines and recommendations was stressed to the patient today and they acknowledged that they understand this to me verbally.\par  \par  TESTING/REPORTS:\par  -EKG done 2022 which demonstrated regular sinus rhythm with nonspecific ST-T wave changes BPM of \par  58.\par  \par  -EKG done 2022 which demonstrated regular sinus rhythm with nonspecific ST-T wave changes BPM of 63. \par  \par  -Ca score done 2021 demonstrated 7 Units.\par  \par  -EKG done Oct 11, 2021 which demonstrated regular sinus rhythm with nonspecific ST-T wave changes, BPM of 76.\par  \par  -The patient had normal exercise stress test 2021.\par  \par  -Echo Doppler examination done 2021 demonstrated minimal to mild mitral and tricuspid valve regurgitation, physiologic pulmonic valve regurgitation, normal left ventricular function, and estimated ejection fraction 68%.\par  \par  PMH:\par  She states her palpitations have greatly improved and she believes they were being caused by a disc in her back which may have been pressing on her spinal cord. She states that she has been following up with her neurologist closely. \par  \par  PLAN:\par  -24 hour holter monitor to evaluate her palpitations.\par  -She will continue with her usual medications and will contact the office if she is having any complaints between now and their next follow up appointment.\par  -She will follow up with her nephrologist and neurologist\par  -The patient will schedule an Exercise Stress Test rule out significant coronary artery disease.\par  -The patient will schedule an Echo Doppler examination to evaluate murmur, left ventricular function, chamber size, and rule out hypertrophy. \par  \par  I have discussed the plan of care with Ms. JOSSUE PAGAN  and she  will follow up in 3 months. She is compliant with all of her medications.\par  \par  The patient understands that aerobic exercises must be increased to minutes 4 times/week and a detailed discussion of lifestyle modification was done today. \par  The patient has a good understanding of the diagnosis, treatment plan and lifestyle modification. \par  She will contact me at the office for any questions with their care or any changes in their health status.\par  \par  The plan of care was discussed with the patient with supervision physician Dr. Az Snowden and will be carried out as noted above.\par  \par  Kelsey SOLORIO

## 2024-08-02 RX ORDER — FAMOTIDINE 10 MG/1
TABLET, FILM COATED ORAL
Refills: 0 | Status: ACTIVE | COMMUNITY

## 2024-08-05 ENCOUNTER — TRANSCRIPTION ENCOUNTER (OUTPATIENT)
Age: 59
End: 2024-08-05

## 2024-08-07 ENCOUNTER — NON-APPOINTMENT (OUTPATIENT)
Age: 59
End: 2024-08-07

## 2024-08-07 ENCOUNTER — TRANSCRIPTION ENCOUNTER (OUTPATIENT)
Age: 59
End: 2024-08-07

## 2024-09-24 NOTE — ED ADULT NURSE NOTE - BREATHING, MLM
Spontaneous, unlabored and symmetrical

## 2024-11-11 ENCOUNTER — APPOINTMENT (OUTPATIENT)
Dept: CARDIOLOGY | Facility: CLINIC | Age: 59
End: 2024-11-11

## 2024-11-12 ENCOUNTER — TRANSCRIPTION ENCOUNTER (OUTPATIENT)
Age: 59
End: 2024-11-12

## 2024-12-11 ENCOUNTER — OUTPATIENT (OUTPATIENT)
Dept: OUTPATIENT SERVICES | Facility: HOSPITAL | Age: 59
LOS: 1 days | End: 2024-12-11
Payer: COMMERCIAL

## 2024-12-11 ENCOUNTER — APPOINTMENT (OUTPATIENT)
Dept: UROLOGY | Facility: CLINIC | Age: 59
End: 2024-12-11
Payer: MEDICARE

## 2024-12-11 ENCOUNTER — APPOINTMENT (OUTPATIENT)
Dept: UROLOGY | Facility: CLINIC | Age: 59
End: 2024-12-11

## 2024-12-11 DIAGNOSIS — N28.1 CYST OF KIDNEY, ACQUIRED: ICD-10-CM

## 2024-12-11 DIAGNOSIS — R82.991 HYPOCITRATURIA: ICD-10-CM

## 2024-12-11 DIAGNOSIS — Z98.890 OTHER SPECIFIED POSTPROCEDURAL STATES: Chronic | ICD-10-CM

## 2024-12-11 DIAGNOSIS — N20.0 CALCULUS OF KIDNEY: ICD-10-CM

## 2024-12-11 DIAGNOSIS — Z96.0 PRESENCE OF UROGENITAL IMPLANTS: Chronic | ICD-10-CM

## 2024-12-11 DIAGNOSIS — R35.0 FREQUENCY OF MICTURITION: ICD-10-CM

## 2024-12-11 DIAGNOSIS — Q61.5 MEDULLARY CYSTIC KIDNEY: ICD-10-CM

## 2024-12-11 DIAGNOSIS — R82.994 HYPERCALCIURIA: ICD-10-CM

## 2024-12-11 PROCEDURE — 76770 US EXAM ABDO BACK WALL COMP: CPT | Mod: 26

## 2024-12-11 PROCEDURE — 99214 OFFICE O/P EST MOD 30 MIN: CPT | Mod: 25

## 2024-12-11 PROCEDURE — 76770 US EXAM ABDO BACK WALL COMP: CPT

## 2024-12-12 ENCOUNTER — NON-APPOINTMENT (OUTPATIENT)
Age: 59
End: 2024-12-12

## 2024-12-12 DIAGNOSIS — N28.1 CYST OF KIDNEY, ACQUIRED: ICD-10-CM

## 2024-12-12 DIAGNOSIS — Q61.5 MEDULLARY CYSTIC KIDNEY: ICD-10-CM

## 2024-12-12 DIAGNOSIS — R82.994 HYPERCALCIURIA: ICD-10-CM

## 2024-12-12 DIAGNOSIS — N20.0 CALCULUS OF KIDNEY: ICD-10-CM

## 2024-12-12 DIAGNOSIS — R82.991 HYPOCITRATURIA: ICD-10-CM

## 2024-12-13 ENCOUNTER — RESULT REVIEW (OUTPATIENT)
Age: 59
End: 2024-12-13

## 2024-12-16 ENCOUNTER — APPOINTMENT (OUTPATIENT)
Dept: CT IMAGING | Facility: IMAGING CENTER | Age: 59
End: 2024-12-16
Payer: MEDICARE

## 2024-12-16 ENCOUNTER — OUTPATIENT (OUTPATIENT)
Dept: OUTPATIENT SERVICES | Facility: HOSPITAL | Age: 59
LOS: 1 days | End: 2024-12-16
Payer: COMMERCIAL

## 2024-12-16 DIAGNOSIS — Z98.890 OTHER SPECIFIED POSTPROCEDURAL STATES: Chronic | ICD-10-CM

## 2024-12-16 DIAGNOSIS — N20.0 CALCULUS OF KIDNEY: ICD-10-CM

## 2024-12-16 DIAGNOSIS — Z00.8 ENCOUNTER FOR OTHER GENERAL EXAMINATION: ICD-10-CM

## 2024-12-16 DIAGNOSIS — N28.1 CYST OF KIDNEY, ACQUIRED: ICD-10-CM

## 2024-12-16 DIAGNOSIS — Z96.0 PRESENCE OF UROGENITAL IMPLANTS: Chronic | ICD-10-CM

## 2024-12-16 PROCEDURE — 74178 CT ABD&PLV WO CNTR FLWD CNTR: CPT | Mod: 26

## 2024-12-16 PROCEDURE — 74178 CT ABD&PLV WO CNTR FLWD CNTR: CPT

## 2024-12-17 ENCOUNTER — TRANSCRIPTION ENCOUNTER (OUTPATIENT)
Age: 59
End: 2024-12-17

## 2024-12-19 ENCOUNTER — TRANSCRIPTION ENCOUNTER (OUTPATIENT)
Age: 59
End: 2024-12-19

## 2025-01-07 ENCOUNTER — APPOINTMENT (OUTPATIENT)
Dept: UROLOGY | Facility: CLINIC | Age: 60
End: 2025-01-07
Payer: MEDICARE

## 2025-01-07 DIAGNOSIS — N20.0 CALCULUS OF KIDNEY: ICD-10-CM

## 2025-01-07 DIAGNOSIS — N28.1 CYST OF KIDNEY, ACQUIRED: ICD-10-CM

## 2025-01-07 DIAGNOSIS — Q61.5 MEDULLARY CYSTIC KIDNEY: ICD-10-CM

## 2025-01-07 DIAGNOSIS — R82.994 HYPERCALCIURIA: ICD-10-CM

## 2025-01-07 DIAGNOSIS — R82.991 HYPOCITRATURIA: ICD-10-CM

## 2025-01-07 PROCEDURE — 99212 OFFICE O/P EST SF 10 MIN: CPT

## 2025-01-20 ENCOUNTER — RX RENEWAL (OUTPATIENT)
Age: 60
End: 2025-01-20

## 2025-01-27 ENCOUNTER — APPOINTMENT (OUTPATIENT)
Dept: CARDIOLOGY | Facility: CLINIC | Age: 60
End: 2025-01-27
Payer: MEDICARE

## 2025-01-27 ENCOUNTER — NON-APPOINTMENT (OUTPATIENT)
Age: 60
End: 2025-01-27

## 2025-01-27 VITALS
SYSTOLIC BLOOD PRESSURE: 126 MMHG | OXYGEN SATURATION: 97 % | RESPIRATION RATE: 16 BRPM | DIASTOLIC BLOOD PRESSURE: 79 MMHG | TEMPERATURE: 98.3 F | WEIGHT: 190 LBS | HEART RATE: 65 BPM | BODY MASS INDEX: 28.79 KG/M2 | HEIGHT: 68 IN

## 2025-01-27 DIAGNOSIS — E78.41 ELEVATED LIPOPROTEIN(A): ICD-10-CM

## 2025-01-27 DIAGNOSIS — I25.10 ATHEROSCLEROTIC HEART DISEASE OF NATIVE CORONARY ARTERY W/OUT ANGINA PECTORIS: ICD-10-CM

## 2025-01-27 DIAGNOSIS — R00.2 PALPITATIONS: ICD-10-CM

## 2025-01-27 DIAGNOSIS — E78.01 FAMILIAL HYPERCHOLESTEROLEMIA: ICD-10-CM

## 2025-01-27 DIAGNOSIS — I34.1 NONRHEUMATIC MITRAL (VALVE) PROLAPSE: ICD-10-CM

## 2025-01-27 PROCEDURE — 93000 ELECTROCARDIOGRAM COMPLETE: CPT

## 2025-01-27 PROCEDURE — 99215 OFFICE O/P EST HI 40 MIN: CPT | Mod: 25

## 2025-02-06 ENCOUNTER — APPOINTMENT (OUTPATIENT)
Dept: UROLOGY | Facility: CLINIC | Age: 60
End: 2025-02-06

## 2025-02-06 DIAGNOSIS — N20.0 CALCULUS OF KIDNEY: ICD-10-CM

## 2025-02-06 DIAGNOSIS — Q61.5 MEDULLARY CYSTIC KIDNEY: ICD-10-CM

## 2025-02-10 ENCOUNTER — NON-APPOINTMENT (OUTPATIENT)
Age: 60
End: 2025-02-10

## 2025-02-11 ENCOUNTER — NON-APPOINTMENT (OUTPATIENT)
Age: 60
End: 2025-02-11

## 2025-02-11 ENCOUNTER — APPOINTMENT (OUTPATIENT)
Dept: CARDIOLOGY | Facility: CLINIC | Age: 60
End: 2025-02-11
Payer: MEDICARE

## 2025-02-11 DIAGNOSIS — R00.2 PALPITATIONS: ICD-10-CM

## 2025-02-11 PROCEDURE — 93246 EXT ECG>7D<15D RECORDING: CPT

## 2025-03-18 ENCOUNTER — NON-APPOINTMENT (OUTPATIENT)
Age: 60
End: 2025-03-18

## 2025-03-18 RX ORDER — METOPROLOL SUCCINATE 25 MG/1
25 TABLET, EXTENDED RELEASE ORAL
Qty: 90 | Refills: 1 | Status: ACTIVE | COMMUNITY
Start: 2025-03-18 | End: 1900-01-01

## 2025-03-24 ENCOUNTER — NON-APPOINTMENT (OUTPATIENT)
Age: 60
End: 2025-03-24

## 2025-03-25 ENCOUNTER — NON-APPOINTMENT (OUTPATIENT)
Age: 60
End: 2025-03-25

## 2025-03-25 ENCOUNTER — APPOINTMENT (OUTPATIENT)
Dept: ELECTROPHYSIOLOGY | Facility: CLINIC | Age: 60
End: 2025-03-25
Payer: MEDICARE

## 2025-03-25 VITALS
SYSTOLIC BLOOD PRESSURE: 105 MMHG | OXYGEN SATURATION: 100 % | BODY MASS INDEX: 29.25 KG/M2 | DIASTOLIC BLOOD PRESSURE: 73 MMHG | WEIGHT: 193 LBS | HEIGHT: 68 IN | HEART RATE: 63 BPM

## 2025-03-25 DIAGNOSIS — R00.2 PALPITATIONS: ICD-10-CM

## 2025-03-25 PROCEDURE — 99205 OFFICE O/P NEW HI 60 MIN: CPT | Mod: 25

## 2025-03-25 PROCEDURE — 93000 ELECTROCARDIOGRAM COMPLETE: CPT

## 2025-03-25 RX ORDER — FAMOTIDINE 20 MG/1
20 TABLET, FILM COATED ORAL DAILY
Refills: 0 | Status: ACTIVE | COMMUNITY
Start: 2025-03-25

## 2025-03-27 ENCOUNTER — TRANSCRIPTION ENCOUNTER (OUTPATIENT)
Age: 60
End: 2025-03-27

## 2025-04-17 ENCOUNTER — TRANSCRIPTION ENCOUNTER (OUTPATIENT)
Age: 60
End: 2025-04-17

## 2025-05-06 ENCOUNTER — TRANSCRIPTION ENCOUNTER (OUTPATIENT)
Age: 60
End: 2025-05-06

## 2025-05-26 ENCOUNTER — TRANSCRIPTION ENCOUNTER (OUTPATIENT)
Age: 60
End: 2025-05-26

## 2025-06-04 ENCOUNTER — TRANSCRIPTION ENCOUNTER (OUTPATIENT)
Age: 60
End: 2025-06-04

## 2025-06-09 ENCOUNTER — TRANSCRIPTION ENCOUNTER (OUTPATIENT)
Age: 60
End: 2025-06-09

## 2025-06-10 RX ORDER — EVOLOCUMAB 140 MG/ML
140 INJECTION, SOLUTION SUBCUTANEOUS
Qty: 3 | Refills: 1 | Status: ACTIVE | COMMUNITY
Start: 2025-06-09

## 2025-06-12 ENCOUNTER — APPOINTMENT (OUTPATIENT)
Dept: UROLOGY | Facility: CLINIC | Age: 60
End: 2025-06-12

## 2025-06-24 ENCOUNTER — NON-APPOINTMENT (OUTPATIENT)
Age: 60
End: 2025-06-24

## 2025-06-24 ENCOUNTER — APPOINTMENT (OUTPATIENT)
Dept: ELECTROPHYSIOLOGY | Facility: CLINIC | Age: 60
End: 2025-06-24
Payer: MEDICARE

## 2025-06-24 VITALS — DIASTOLIC BLOOD PRESSURE: 84 MMHG | OXYGEN SATURATION: 93 % | SYSTOLIC BLOOD PRESSURE: 121 MMHG | HEART RATE: 59 BPM

## 2025-06-24 PROCEDURE — 99213 OFFICE O/P EST LOW 20 MIN: CPT

## 2025-06-25 ENCOUNTER — APPOINTMENT (OUTPATIENT)
Dept: UROLOGY | Facility: CLINIC | Age: 60
End: 2025-06-25

## 2025-07-01 ENCOUNTER — APPOINTMENT (OUTPATIENT)
Dept: ULTRASOUND IMAGING | Facility: CLINIC | Age: 60
End: 2025-07-01

## 2025-07-02 ENCOUNTER — APPOINTMENT (OUTPATIENT)
Dept: ULTRASOUND IMAGING | Facility: CLINIC | Age: 60
End: 2025-07-02
Payer: MEDICARE

## 2025-07-02 LAB
APPEARANCE: CLEAR
BACTERIA: NEGATIVE /HPF
BILIRUBIN URINE: NEGATIVE
BLOOD URINE: ABNORMAL
CAST: 0 /LPF
COLOR: YELLOW
EPITHELIAL CELLS: 0 /HPF
GLUCOSE QUALITATIVE U: NEGATIVE MG/DL
KETONES URINE: NEGATIVE MG/DL
LEUKOCYTE ESTERASE URINE: ABNORMAL
MICROSCOPIC-UA: NORMAL
NITRITE URINE: NEGATIVE
PH URINE: 6.5
PROTEIN URINE: NEGATIVE MG/DL
RED BLOOD CELLS URINE: 1 /HPF
SPECIFIC GRAVITY URINE: 1.01
UROBILINOGEN URINE: 0.2 MG/DL
WHITE BLOOD CELLS URINE: 0 /HPF

## 2025-07-02 PROCEDURE — 76770 US EXAM ABDO BACK WALL COMP: CPT

## 2025-07-03 LAB — BACTERIA UR CULT: NORMAL

## 2025-07-08 ENCOUNTER — APPOINTMENT (OUTPATIENT)
Dept: UROLOGY | Facility: CLINIC | Age: 60
End: 2025-07-08

## 2025-07-14 ENCOUNTER — LABORATORY RESULT (OUTPATIENT)
Age: 60
End: 2025-07-14

## 2025-07-14 ENCOUNTER — APPOINTMENT (OUTPATIENT)
Dept: CARDIOLOGY | Facility: CLINIC | Age: 60
End: 2025-07-14
Payer: MEDICARE

## 2025-07-14 VITALS
DIASTOLIC BLOOD PRESSURE: 72 MMHG | SYSTOLIC BLOOD PRESSURE: 115 MMHG | BODY MASS INDEX: 28.49 KG/M2 | HEART RATE: 75 BPM | WEIGHT: 188 LBS | RESPIRATION RATE: 16 BRPM | HEIGHT: 68 IN | TEMPERATURE: 97.8 F | OXYGEN SATURATION: 97 %

## 2025-07-14 PROCEDURE — 99214 OFFICE O/P EST MOD 30 MIN: CPT

## 2025-07-14 PROCEDURE — G2211 COMPLEX E/M VISIT ADD ON: CPT

## 2025-07-28 ENCOUNTER — TRANSCRIPTION ENCOUNTER (OUTPATIENT)
Age: 60
End: 2025-07-28

## 2025-07-29 ENCOUNTER — APPOINTMENT (OUTPATIENT)
Dept: UROLOGY | Facility: CLINIC | Age: 60
End: 2025-07-29
Payer: MEDICARE

## 2025-07-29 DIAGNOSIS — R82.994 HYPERCALCIURIA: ICD-10-CM

## 2025-07-29 DIAGNOSIS — Q61.5 MEDULLARY CYSTIC KIDNEY: ICD-10-CM

## 2025-07-29 DIAGNOSIS — R82.991 HYPOCITRATURIA: ICD-10-CM

## 2025-07-29 DIAGNOSIS — N20.0 CALCULUS OF KIDNEY: ICD-10-CM

## 2025-07-29 PROCEDURE — 99213 OFFICE O/P EST LOW 20 MIN: CPT | Mod: 93

## 2025-09-08 ENCOUNTER — RX RENEWAL (OUTPATIENT)
Age: 60
End: 2025-09-08

## 2025-09-10 ENCOUNTER — TRANSCRIPTION ENCOUNTER (OUTPATIENT)
Age: 60
End: 2025-09-10

## 2025-09-12 DIAGNOSIS — I48.0 PAROXYSMAL ATRIAL FIBRILLATION: ICD-10-CM

## 2025-09-15 ENCOUNTER — APPOINTMENT (OUTPATIENT)
Dept: ELECTROPHYSIOLOGY | Facility: CLINIC | Age: 60
End: 2025-09-15

## 2025-09-15 ENCOUNTER — NON-APPOINTMENT (OUTPATIENT)
Age: 60
End: 2025-09-15

## 2025-09-15 ENCOUNTER — TRANSCRIPTION ENCOUNTER (OUTPATIENT)
Age: 60
End: 2025-09-15

## 2025-09-15 DIAGNOSIS — I25.10 ATHEROSCLEROTIC HEART DISEASE OF NATIVE CORONARY ARTERY W/OUT ANGINA PECTORIS: ICD-10-CM

## 2025-09-15 DIAGNOSIS — E78.01 FAMILIAL HYPERCHOLESTEROLEMIA: ICD-10-CM

## 2025-09-15 RX ORDER — DRONEDARONE 400 MG/1
400 TABLET, FILM COATED ORAL TWICE DAILY
Qty: 60 | Refills: 2 | Status: ACTIVE | COMMUNITY
Start: 2025-09-15 | End: 1900-01-01

## 2025-09-16 ENCOUNTER — TRANSCRIPTION ENCOUNTER (OUTPATIENT)
Age: 60
End: 2025-09-16